# Patient Record
Sex: FEMALE | Race: WHITE | Employment: FULL TIME | URBAN - METROPOLITAN AREA
[De-identification: names, ages, dates, MRNs, and addresses within clinical notes are randomized per-mention and may not be internally consistent; named-entity substitution may affect disease eponyms.]

---

## 2018-05-02 ENCOUNTER — OFFICE VISIT (OUTPATIENT)
Dept: FAMILY MEDICINE CLINIC | Age: 23
End: 2018-05-02
Payer: MEDICARE

## 2018-05-02 VITALS
TEMPERATURE: 98.3 F | BODY MASS INDEX: 26.45 KG/M2 | OXYGEN SATURATION: 96 % | WEIGHT: 164.6 LBS | SYSTOLIC BLOOD PRESSURE: 97 MMHG | RESPIRATION RATE: 16 BRPM | HEIGHT: 66 IN | DIASTOLIC BLOOD PRESSURE: 63 MMHG | HEART RATE: 74 BPM

## 2018-05-02 DIAGNOSIS — R23.8 OTHER SKIN CHANGES: ICD-10-CM

## 2018-05-02 DIAGNOSIS — Z00.01 ENCOUNTER FOR WELL ADULT EXAM WITH ABNORMAL FINDINGS: Primary | ICD-10-CM

## 2018-05-02 DIAGNOSIS — E88.81 INSULIN RESISTANCE: ICD-10-CM

## 2018-05-02 DIAGNOSIS — R53.83 FATIGUE, UNSPECIFIED TYPE: ICD-10-CM

## 2018-05-02 PROCEDURE — 1036F TOBACCO NON-USER: CPT | Performed by: FAMILY MEDICINE

## 2018-05-02 PROCEDURE — 99395 PREV VISIT EST AGE 18-39: CPT | Performed by: FAMILY MEDICINE

## 2018-05-02 PROCEDURE — G8419 CALC BMI OUT NRM PARAM NOF/U: HCPCS | Performed by: FAMILY MEDICINE

## 2018-05-02 PROCEDURE — G8427 DOCREV CUR MEDS BY ELIG CLIN: HCPCS | Performed by: FAMILY MEDICINE

## 2018-05-02 PROCEDURE — 99214 OFFICE O/P EST MOD 30 MIN: CPT | Performed by: FAMILY MEDICINE

## 2018-05-02 ASSESSMENT — PATIENT HEALTH QUESTIONNAIRE - PHQ9
SUM OF ALL RESPONSES TO PHQ9 QUESTIONS 1 & 2: 0
2. FEELING DOWN, DEPRESSED OR HOPELESS: 0
SUM OF ALL RESPONSES TO PHQ QUESTIONS 1-9: 0
1. LITTLE INTEREST OR PLEASURE IN DOING THINGS: 0

## 2018-05-03 LAB
ALBUMIN SERPL-MCNC: NORMAL G/DL
ALP BLD-CCNC: NORMAL U/L
ALT SERPL-CCNC: NORMAL U/L
ANION GAP SERPL CALCULATED.3IONS-SCNC: NORMAL MMOL/L
AST SERPL-CCNC: NORMAL U/L
BASOPHILS ABSOLUTE: NORMAL /ΜL
BASOPHILS RELATIVE PERCENT: NORMAL %
BILIRUB SERPL-MCNC: NORMAL MG/DL (ref 0.1–1.4)
BILIRUBIN, URINE: NORMAL
BLOOD, URINE: NORMAL
BUN BLDV-MCNC: NORMAL MG/DL
CALCIUM SERPL-MCNC: NORMAL MG/DL
CHLORIDE BLD-SCNC: NORMAL MMOL/L
CLARITY: NORMAL
CO2: NORMAL MMOL/L
COLOR: NORMAL
CREAT SERPL-MCNC: NORMAL MG/DL
EOSINOPHILS ABSOLUTE: NORMAL /ΜL
EOSINOPHILS RELATIVE PERCENT: NORMAL %
GFR CALCULATED: NORMAL
GLUCOSE BLD-MCNC: NORMAL MG/DL
GLUCOSE URINE: NORMAL
HCT VFR BLD CALC: NORMAL % (ref 36–46)
HEMOGLOBIN: NORMAL G/DL (ref 12–16)
KETONES, URINE: NORMAL
LEUKOCYTE ESTERASE, URINE: NORMAL
LYMPHOCYTES ABSOLUTE: NORMAL /ΜL
LYMPHOCYTES RELATIVE PERCENT: NORMAL %
MCH RBC QN AUTO: NORMAL PG
MCHC RBC AUTO-ENTMCNC: NORMAL G/DL
MCV RBC AUTO: NORMAL FL
MONOCYTES ABSOLUTE: NORMAL /ΜL
MONOCYTES RELATIVE PERCENT: NORMAL %
NEUTROPHILS ABSOLUTE: NORMAL /ΜL
NEUTROPHILS RELATIVE PERCENT: NORMAL %
NITRITE, URINE: NORMAL
PDW BLD-RTO: NORMAL %
PH UA: NORMAL (ref 4.5–8)
PLATELET # BLD: NORMAL K/ΜL
PMV BLD AUTO: NORMAL FL
POTASSIUM SERPL-SCNC: NORMAL MMOL/L
PROTEIN UA: NORMAL
RBC # BLD: NORMAL 10^6/ΜL
SODIUM BLD-SCNC: NORMAL MMOL/L
SPECIFIC GRAVITY, URINE: NORMAL
T4 FREE: NORMAL
TOTAL PROTEIN: NORMAL
TSH SERPL DL<=0.05 MIU/L-ACNC: NORMAL UIU/ML
UROBILINOGEN, URINE: NORMAL
VITAMIN B-12: NORMAL
VITAMIN D 25-HYDROXY: NORMAL
VITAMIN D2, 25 HYDROXY: NORMAL
VITAMIN D3,25 HYDROXY: NORMAL
WBC # BLD: NORMAL 10^3/ML

## 2018-05-07 DIAGNOSIS — R53.83 FATIGUE, UNSPECIFIED TYPE: ICD-10-CM

## 2018-05-07 DIAGNOSIS — E88.81 INSULIN RESISTANCE: ICD-10-CM

## 2018-05-07 DIAGNOSIS — Z00.01 ENCOUNTER FOR WELL ADULT EXAM WITH ABNORMAL FINDINGS: ICD-10-CM

## 2018-05-08 RX ORDER — ERGOCALCIFEROL (VITAMIN D2) 1250 MCG
50000 CAPSULE ORAL WEEKLY
Qty: 4 CAPSULE | Refills: 3 | Status: SHIPPED | OUTPATIENT
Start: 2018-05-08 | End: 2021-07-02

## 2018-08-01 ENCOUNTER — HOSPITAL ENCOUNTER (OUTPATIENT)
Dept: GENERAL RADIOLOGY | Age: 23
Discharge: HOME | End: 2018-08-03
Payer: MEDICARE

## 2018-08-01 ENCOUNTER — HOSPITAL ENCOUNTER (OUTPATIENT)
Age: 23
Discharge: HOME | End: 2018-08-03
Payer: MEDICARE

## 2018-08-01 DIAGNOSIS — M25.512 LEFT SHOULDER PAIN, UNSPECIFIED CHRONICITY: ICD-10-CM

## 2018-08-01 PROCEDURE — 72040 X-RAY EXAM NECK SPINE 2-3 VW: CPT

## 2021-01-30 ENCOUNTER — APPOINTMENT (OUTPATIENT)
Dept: GENERAL RADIOLOGY | Age: 26
End: 2021-01-30
Payer: OTHER MISCELLANEOUS

## 2021-01-30 ENCOUNTER — HOSPITAL ENCOUNTER (EMERGENCY)
Age: 26
Discharge: HOME | End: 2021-01-30
Attending: EMERGENCY MEDICINE
Payer: OTHER MISCELLANEOUS

## 2021-01-30 VITALS
DIASTOLIC BLOOD PRESSURE: 78 MMHG | RESPIRATION RATE: 16 BRPM | OXYGEN SATURATION: 99 % | SYSTOLIC BLOOD PRESSURE: 127 MMHG | HEART RATE: 78 BPM | TEMPERATURE: 98.2 F
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
## 2021-01-30 DIAGNOSIS — S39.012A STRAIN OF LUMBAR REGION, INITIAL ENCOUNTER: ICD-10-CM

## 2021-01-30 DIAGNOSIS — S46.912A STRAIN OF LEFT SHOULDER, INITIAL ENCOUNTER: Primary | ICD-10-CM

## 2021-01-30 PROCEDURE — 6370000000 HC RX 637 (ALT 250 FOR IP): Performed by: STUDENT IN AN ORGANIZED HEALTH CARE EDUCATION/TRAINING PROGRAM

## 2021-01-30 PROCEDURE — 73030 X-RAY EXAM OF SHOULDER: CPT

## 2021-01-30 PROCEDURE — 99284 EMERGENCY DEPT VISIT MOD MDM: CPT

## 2021-01-30 PROCEDURE — 72100 X-RAY EXAM L-S SPINE 2/3 VWS: CPT

## 2021-01-30 RX ORDER — METHOCARBAMOL 500 MG/1
1000 TABLET, FILM COATED ORAL ONCE
Status: COMPLETED | OUTPATIENT
Start: 2021-01-30 | End: 2021-01-30

## 2021-01-30 RX ORDER — METHOCARBAMOL 750 MG/1
750 TABLET, FILM COATED ORAL 4 TIMES DAILY PRN
Qty: 40 TABLET | Refills: 0 | Status: SHIPPED | OUTPATIENT
Start: 2021-01-30 | End: 2021-02-09

## 2021-01-30 RX ORDER — ACETAMINOPHEN 500 MG
1000 TABLET ORAL ONCE
Status: COMPLETED | OUTPATIENT
Start: 2021-01-30 | End: 2021-01-30

## 2021-01-30 RX ORDER — ACETAMINOPHEN 500 MG
1000 TABLET ORAL 3 TIMES DAILY
Qty: 42 TABLET | Refills: 0 | Status: SHIPPED | OUTPATIENT
Start: 2021-01-30 | End: 2022-04-29

## 2021-01-30 RX ADMIN — ACETAMINOPHEN 1000 MG: 500 TABLET ORAL at 22:33

## 2021-01-30 RX ADMIN — METHOCARBAMOL 1000 MG: 500 TABLET ORAL at 22:33

## 2021-01-30 ASSESSMENT — ENCOUNTER SYMPTOMS
SHORTNESS OF BREATH: 0
COUGH: 0
VOMITING: 0
ABDOMINAL PAIN: 0
RHINORRHEA: 0
NAUSEA: 0

## 2021-01-30 ASSESSMENT — PAIN SCALES - GENERAL
PAINLEVEL_OUTOF10: 7
PAINLEVEL_OUTOF10: 8
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
## 2021-01-31 NOTE — ED NOTES
Bed: 01  Expected date:   Expected time:   Means of arrival:   Comments:  Medic Michele Larsen RN  01/30/21 1985
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
## 2021-07-02 ENCOUNTER — OFFICE VISIT (OUTPATIENT)
Dept: FAMILY MEDICINE CLINIC | Age: 26
End: 2021-07-02
Payer: MEDICARE

## 2021-07-02 VITALS
HEART RATE: 76 BPM | HEIGHT: 67 IN | TEMPERATURE: 97.5 F | OXYGEN SATURATION: 98 % | BODY MASS INDEX: 32.11 KG/M2 | DIASTOLIC BLOOD PRESSURE: 72 MMHG | WEIGHT: 204.6 LBS | SYSTOLIC BLOOD PRESSURE: 118 MMHG

## 2021-07-02 DIAGNOSIS — R19.4 CHANGE IN BOWEL HABITS: ICD-10-CM

## 2021-07-02 DIAGNOSIS — F41.9 ANXIETY AND DEPRESSION: ICD-10-CM

## 2021-07-02 DIAGNOSIS — Z76.89 ENCOUNTER TO ESTABLISH CARE: Primary | ICD-10-CM

## 2021-07-02 DIAGNOSIS — R19.5 LOOSE STOOLS: ICD-10-CM

## 2021-07-02 DIAGNOSIS — F32.A ANXIETY AND DEPRESSION: ICD-10-CM

## 2021-07-02 PROCEDURE — 99203 OFFICE O/P NEW LOW 30 MIN: CPT | Performed by: NURSE PRACTITIONER

## 2021-07-02 PROCEDURE — 1036F TOBACCO NON-USER: CPT | Performed by: NURSE PRACTITIONER

## 2021-07-02 PROCEDURE — G8427 DOCREV CUR MEDS BY ELIG CLIN: HCPCS | Performed by: NURSE PRACTITIONER

## 2021-07-02 PROCEDURE — G8417 CALC BMI ABV UP PARAM F/U: HCPCS | Performed by: NURSE PRACTITIONER

## 2021-07-02 RX ORDER — MELATONIN
1000 DAILY
Qty: 30 TABLET | Refills: 11 | Status: SHIPPED | OUTPATIENT
Start: 2021-07-02 | End: 2022-08-22

## 2021-07-02 RX ORDER — LIDOCAINE 50 MG/G
1 PATCH TOPICAL DAILY PRN
COMMUNITY

## 2021-07-02 RX ORDER — ESCITALOPRAM OXALATE 10 MG/1
15 TABLET ORAL DAILY
COMMUNITY
Start: 2021-06-04 | End: 2022-03-02

## 2021-07-02 SDOH — ECONOMIC STABILITY: FOOD INSECURITY: WITHIN THE PAST 12 MONTHS, THE FOOD YOU BOUGHT JUST DIDN'T LAST AND YOU DIDN'T HAVE MONEY TO GET MORE.: NEVER TRUE

## 2021-07-02 SDOH — ECONOMIC STABILITY: FOOD INSECURITY: WITHIN THE PAST 12 MONTHS, YOU WORRIED THAT YOUR FOOD WOULD RUN OUT BEFORE YOU GOT MONEY TO BUY MORE.: NEVER TRUE

## 2021-07-02 SDOH — ECONOMIC STABILITY: TRANSPORTATION INSECURITY
IN THE PAST 12 MONTHS, HAS LACK OF TRANSPORTATION KEPT YOU FROM MEETINGS, WORK, OR FROM GETTING THINGS NEEDED FOR DAILY LIVING?: NO

## 2021-07-02 SDOH — ECONOMIC STABILITY: TRANSPORTATION INSECURITY
IN THE PAST 12 MONTHS, HAS THE LACK OF TRANSPORTATION KEPT YOU FROM MEDICAL APPOINTMENTS OR FROM GETTING MEDICATIONS?: NO

## 2021-07-02 ASSESSMENT — ENCOUNTER SYMPTOMS
DIARRHEA: 1
COLOR CHANGE: 0
NAUSEA: 1
CONSTIPATION: 0
FLATUS: 0
COUGH: 0
SHORTNESS OF BREATH: 0
ALLERGIC/IMMUNOLOGIC NEGATIVE: 1
BLOATING: 0
EYES NEGATIVE: 1
BLOOD IN STOOL: 0
ABDOMINAL PAIN: 0
VOMITING: 0
ANAL BLEEDING: 0
RESPIRATORY NEGATIVE: 1

## 2021-07-02 ASSESSMENT — PATIENT HEALTH QUESTIONNAIRE - PHQ9
2. FEELING DOWN, DEPRESSED OR HOPELESS: 0
SUM OF ALL RESPONSES TO PHQ QUESTIONS 1-9: 0
SUM OF ALL RESPONSES TO PHQ9 QUESTIONS 1 & 2: 0
1. LITTLE INTEREST OR PLEASURE IN DOING THINGS: 0

## 2021-07-02 ASSESSMENT — SOCIAL DETERMINANTS OF HEALTH (SDOH): HOW HARD IS IT FOR YOU TO PAY FOR THE VERY BASICS LIKE FOOD, HOUSING, MEDICAL CARE, AND HEATING?: NOT HARD AT ALL

## 2021-07-02 NOTE — PROGRESS NOTES
[facility]  [address] [facility]  Dept: [phone]    Mikey Short is a 23 y.o. female who presents today for her medical conditions/complaintsas noted below. Mikey Short is here today c/o Establish Care, Stool Color Change (yellow, loose), and Depression (wants gene testing)    Past Medical History:   Diagnosis Date    Anxiety     Depression     Insulin resistance     PCOS (polycystic ovarian syndrome)     Ulnar neuropathy       Past Surgical History:   Procedure Laterality Date    ULNAR TUNNEL RELEASE Left     WISDOM TOOTH EXTRACTION         Family History   Problem Relation Age of Onset    COPD Mother     No Known Problems Father     Diabetes type 2  Maternal Grandmother     COPD Maternal Grandmother     Diabetes type 2  Maternal Grandfather     Hypertension Maternal Grandfather     Lung Cancer Paternal Grandmother        Social History     Tobacco Use    Smoking status: Never Smoker    Smokeless tobacco: Never Used   Substance Use Topics    Alcohol use: Yes     Comment: social      Current Outpatient Medications   Medication Sig Dispense Refill    escitalopram (LEXAPRO) 10 MG tablet daily      lidocaine (LIDODERM) 5 % Place 1 patch onto the skin daily as needed for Pain 12 hours on, 12 hours off.  vitamin D3 (CHOLECALCIFEROL) 25 MCG (1000 UT) TABS tablet Take 1 tablet by mouth daily 30 tablet 11    acetaminophen (TYLENOL) 500 MG tablet Take 2 tablets by mouth 3 times daily for 7 days 42 tablet 0    Levocetirizine Dihydrochloride (XYZAL PO) Take by mouth       No current facility-administered medications for this visit.      Allergies   Allergen Reactions    Amoxicillin     Iv [Iodides]     Keflex [Cephalexin]     Naproxen     Pcn [Penicillins]          HPI:     Presents today c/o new patient  No previous PCP   Childhood vaccines UTD     Specialists - Neurology Dr. Champ Olmstead - h/o ulnar neuropathy  GYN - Dr. Alberto Umana   Psychiatry - [employer] h/o anxiety / depression    PMH - PCOS, ulnar neuropathy, anxiety / depression   PSH - ulnar tunnel release   PFH - COPD in mother & grandmother  Non smoker, social alcohol, no illicit drug use     Diarrhea   This is a chronic (loose stools, yellow in color, ongoing x 2019, described as yellow in color) problem. The current episode started more than 1 year ago. The problem occurs 2 to 4 times per day (2-3 bowel movements daily). The problem has been waxing and waning. Diarrhea characteristics: yellow in color \"floats\", no blood, mucus or watery diarrhea. The patient states that diarrhea does not awaken her from sleep. Pertinent negatives include no abdominal pain, arthralgias, bloating, chills, coughing, fever, headaches, increased  flatus, myalgias, sweats, URI, vomiting or weight loss. Associated symptoms comments: + GERD. Exacerbated by: all food. There are no known risk factors. Treatments tried: Pepto Bismol, OTC antiacid  The treatment provided moderate relief. There is no history of bowel resection, inflammatory bowel disease, irritable bowel syndrome, malabsorption, a recent abdominal surgery or short gut syndrome. Used to have issues with constipation   2 years ago sx changed entirely   Declines any significant known GI history   Doesn't related sx to any of her medication(s)     Health Maintenance:      Subjective:     Review of Systems   Constitutional: Negative. Negative for appetite change, chills, diaphoresis, fatigue, fever, unexpected weight change and weight loss. HENT: Negative. Eyes: Negative. Respiratory: Negative. Negative for cough and shortness of breath. Cardiovascular: Negative. Gastrointestinal: Positive for diarrhea and nausea (intermittent). Negative for abdominal pain, anal bleeding, bloating, blood in stool, constipation, flatus and vomiting. Endocrine: Negative. Genitourinary: Negative. Negative for difficulty urinating and dysuria. Musculoskeletal: Negative. Negative for arthralgias and myalgias. Skin: Negative. Negative for color change, pallor, rash and wound. Allergic/Immunologic: Negative. Neurological: Negative. Negative for seizures, syncope, speech difficulty and headaches. Hematological: Negative. Psychiatric/Behavioral: Negative. Objective:     Vitals:    07/02/21 1005   BP: 118/72   Pulse: 76   Temp: 97.5 °F (36.4 °C)   SpO2: 98%       Body mass index is 32.53 kg/m². Physical Exam  Constitutional:       General: She is not in acute distress. Appearance: Normal appearance. She is well-developed. She is obese. She is not ill-appearing, toxic-appearing or diaphoretic. HENT:      Head: Normocephalic and atraumatic. Right Ear: Tympanic membrane, ear canal and external ear normal.      Left Ear: Tympanic membrane, ear canal and external ear normal.      Nose: Nose normal.      Mouth/Throat:      Mouth: Mucous membranes are moist.      Pharynx: Oropharynx is clear. Eyes:      General: No scleral icterus. Right eye: No discharge. Left eye: No discharge. Conjunctiva/sclera: Conjunctivae normal.      Pupils: Pupils are equal, round, and reactive to light. Neck:      Trachea: No tracheal deviation. Cardiovascular:      Rate and Rhythm: Normal rate and regular rhythm. Heart sounds: Normal heart sounds. No murmur heard. No friction rub. No gallop. Pulmonary:      Effort: Pulmonary effort is normal. No tachypnea, accessory muscle usage or respiratory distress. Breath sounds: Normal breath sounds. No stridor. No decreased breath sounds, wheezing, rhonchi or rales. Abdominal:      General: Abdomen is flat. Bowel sounds are normal. There is no distension. Palpations: Abdomen is soft. Tenderness: There is abdominal tenderness in the left lower quadrant. There is no guarding or rebound. Musculoskeletal:         General: No tenderness or deformity.  Normal range of motion. Cervical back: Normal range of motion and neck supple. Skin:     General: Skin is warm and dry. Coloration: Skin is not pale. Findings: No erythema or rash. Neurological:      Mental Status: She is alert and oriented to person, place, and time. GCS: GCS eye subscore is 4. GCS verbal subscore is 5. GCS motor subscore is 6. Gait: Gait is intact. Gait normal.   Psychiatric:         Speech: Speech normal.         Behavior: Behavior normal.         Thought Content: Thought content normal.         Judgment: Judgment normal.           Assessment:         1. Encounter to establish care    2. Change in bowel habits    3. Loose stools    4. Anxiety and depression        Plan:     1. Encounter to establish care    PMH, [address], PF reviewed  COVID 19 vaccine encouraged  GYN for women's health needs    2. Change in bowel habits    - [facility], [address]    Recommend GI consult for possible colonoscopy  Recommended daily probiotic  Elimination diet discussed  No red flag s/s identified   Follow-up PRN   Bentyl if needed      3. Loose stools    - [facility], [address]    4. Anxiety and depression    Follows w/ [facility]  Recommended discussing concentration difficulties & Gene Testing with specialist     Discussed use, benefit, and side effects of prescribed medications. All patient questions answered. Pt voiced understanding. Reviewed health maintenance. Instructed to continue current medications, diet and exercise. Patient agreedwith treatment plan. Follow up as directed.      Electronically signed by FRANTZ Edwards CNP on 7/2/2021

## 2021-07-02 NOTE — PATIENT INSTRUCTIONS
[facility] Gastroenterology  [address]  [phone]  Patient Education        Lactose-Restricted Diet: Care Instructions  Your Care Instructions     Lactose is a sugar that is in milk and milk products. Some people do not make enough of an enzyme called lactase, which digests lactose. When this happens it can cause gas, belly pain, diarrhea, and bloating. This is called lactose intolerance. This is not the same as food allergy to milk. With planning, you can avoid lactose and still eat a tasty and nutritious diet and get enough calcium to maintain healthy bones. Your doctor and dietitian will help you design a diet based on your level of lactose intolerance and what you like to eat. Always talk with your doctor or dietitian before you make changes in your diet. Follow-up care is a key part of your treatment and safety. Be sure to make and go to all appointments, and call your doctor if you are having problems. It's also a good idea to know your test results and keep a list of the medicines you take. How can you care for yourself at home? · Limit the amount of milk and milk products in your diet. Spread small amounts of milk or milk products throughout the day, instead of larger amounts all at once. ? If you have bad symptoms when you eat or drink something with lactose, you may need to avoid it completely. ? You may be able to drink 1 glass of milk each day, although you may not be able to drink more than a ½ cup at a time. All types of milk contain the same amount of lactose. ? If you are not sure whether a milk product causes symptoms, try a small amount and wait to see how you feel before you eat or drink more. · Try yogurt and cheese. These have less lactose than milk and may not cause problems. · Eat or drink milk and milk products that have reduced lactose.  In most grocery stores, you can buy milk with reduced lactose, such as Lactaid milk.  · Use lactase products. These are dietary supplements that help you digest lactose. Some are pills that you chew (such as Lactaid) before you eat or drink milk products. Others are liquids that you add to milk 24 hours before you drink it. Try a few products and brands to see which ones work best for you. · Eat or drink other foods, such as soy milk and soy cheese, instead of milk and milk products. · If you are very sensitive to lactose, read labels carefully to spot the lactose products. ? Some medicines have lactose. ? Prepared foods that may have lactose include breads, baked goods, breakfast cereals, instant breakfast drinks, instant potatoes, instant soups, baking mixes (such as pancake, cookie, and biscuit mixes), margarine, salad dressings, candies, milk chocolate, and other snacks. ? Lactose may also be called whey, curds, or milk products. · Be sure to get enough calcium in your diet, especially if you avoid milk products completely. To get enough calcium, you would need to eat calcium-rich foods as often as someone would drink milk. Calcium is very important because it keeps bones strong and reduces the risk of osteoporosis. Ask your dietitian for advice on how to get enough calcium. Foods that have calcium include:  ? Broccoli, bok veronica, kale, and gracia, mustard, and turnip greens. ? Canned sardines and other small fish that have bones you can eat. ? Calcium-fortified orange juice. ? Soy products such as fortified soy milk and tofu. ? Almonds. ? Dried beans. · If you are worried about getting enough nutrients, ask your doctor about taking supplements, such as calcium and vitamin D. When should you call for help? Call your doctor now or seek immediate medical care if:    · You have new or worse belly pain. Watch closely for changes in your health, and be sure to contact your doctor if:    · You do not get better as expected. Where can you learn more?   Go to [email or web address] and sign in to your [employer] account. Enter S971 in the [facility] box to learn more about \"Lactose-Restricted Diet: Care Instructions. \"     If you do not have an account, please click on the \"Sign Up Now\" link. Current as of: February 10, 2021               Content Version: 12.9  © 2006-2021 [employer]. Care instructions adapted under license by [facility] ([facility]). If you have questions about a medical condition or this instruction, always ask your healthcare professional. [place] [address] any warranty or liability for your use of this information. Patient Education        Lactose Intolerance: Care Instructions  Your Care Instructions     Lactose is sugar that is found in milk and milk products. Some people do not make enough of an enzyme called lactase, which digests lactose. When this happens it can cause gas, belly pain, diarrhea, and bloating. This is called lactose intolerance. This is not the same as food allergy to milk. Lactose intolerance affects different people in different ways. Some people cannot digest any milk products. Other people can eat or drink small amounts of milk products or certain types of milk products without problems. You can learn how to avoid discomfort and still get enough calcium to maintain healthy bones. Follow-up care is a key part of your treatment and safety. Be sure to make and go to all appointments, and call your doctor if you are having problems. It's also a good idea to know your test results and keep a list of the medicines you take. How can you care for yourself at home? · Limit the amount of milk and milk products in your diet. Try to drink 1 glass of milk each day. Drink small amounts several times a day. All types of milk contain the same amount of lactose.  If you are not sure whether a milk product causes symptoms, try a small amount and wait to see how you feel before you eat or drink more. · Eat or drink milk and milk products along with other foods. For some people, combining a solid food (like cereal) with a dairy product (like milk) can reduce symptoms. · Eat small amounts of milk products throughout the day instead of larger amounts all at once. · Eat or drink milk and milk products that have reduced lactose. In most grocery stores, you can buy milk with reduced lactose, such as Lactaid milk. · Eat or drink other foods instead of milk and milk products. Try soy milk and soy cheese, and use nondairy creamers in your coffee. Keep in mind that nondairy creamers may contain more fat than milk. · Use lactase products. These are dietary supplements that help you digest lactose. Some are pills that you chew (such as Lactaid) before you eat or drink milk products. Others are liquids that you add to milk 24 hours before you drink it. Try a few products and brands to see which ones work best for you. · Some people who are lactose-intolerant can eat some kinds of yogurt without problems, especially yogurt with live cultures. It's best to try a small amount of different brands of yogurt to see which ones work best for you. · Watch out for lactose in foods you buy. Some prepared foods contain lactose, including breads and baked goods, breakfast cereals, instant potatoes and soups, margarine, salad dressings, and many snacks. Be sure to read labels for lactose and for lactose's \"hidden\" names. These include dry milk solids, whey, curds, milk by-products, and nonfat dry milk powder. · Be sure to get enough calcium in your diet, especially if you avoid milk products completely. To get enough calcium, you would need to eat calcium-rich foods as often as someone would drink milk. Calcium is very important because it keeps bones strong and reduces the risk of osteoporosis. Ask your dietitian for advice on how to get enough calcium.  Foods that have calcium include:  ? Hermilo Ramos, you take. How can you care for yourself at home? · Don't eat any foods that have gluten in them. These include bagels, bread, crackers, and some cereals. They also include pasta and pizza. · Carefully read food labels. Look for wheat or wheat products in ice cream and candy. You may also find them in salad dressing, canned and frozen soups and vegetables, and other processed foods. · Avoid all beer products unless the label says they are gluten-free. Beers with and without alcohol have gluten unless the labels say they are gluten-free. This includes lagers, ales, and stouts. · Avoid oats, at least at first. Oats may cause symptoms in some people, perhaps as a result of contamination with wheat, barley, or rye during processing. But many people who have celiac disease can eat moderate amounts of oats without having symptoms. Health professionals vary in their long-term recommendations regarding eating foods with oats. But most agree it is safe to eat oats labeled as gluten-free. · When you eat out, look for restaurants that serve gluten-free food. You can also ask if the  is familiar with gluten-free cooking. · Try to learn more about gluten-free options. Find grocery stores that sell gluten-free pizza and other foods. If you have access to the Internet, look online for gluten-free foods and recipes. · On a gluten-free eating plan, it's okay to have:  ? Eggs and dairy products. (But some dairy products may make your symptoms worse. Ask your doctor if you have questions about dairy products. Read ingredient labels carefully. Some processed cheeses contain gluten.)  ? Flours and foods made with amaranth, arrowroot, beans, buckwheat, corn, cornmeal, flax, millet, potatoes, gluten-free nut and oat bran, quinoa, rice, sorghum, soybeans, tapioca, or teff. ? Fresh, frozen, or canned unprocessed meats. But avoid processed meats. Some examples of processed meats to avoid are hot dogs, salami, and deli meat.  Read labels for additives that may contain gluten. ? Fresh, frozen, dried, or canned fruits and vegetables, if they do not have thickeners or other additives that contain gluten. ? Some alcohol drinks. These include wine, liqueurs, and ciders. They also include liquor like whiskey and carl. When should you call for help? Watch closely for changes in your health, and be sure to contact your doctor if:    · You have unexplained weight loss.     · You have diarrhea that lasts longer than 1 to 2 weeks.     · You have unusual fatigue or mood changes, especially if these last more than a week and are not related to any other illness, such as the flu.     · Your symptoms come back again.     · Your stomach pain gets worse. Where can you learn more? Go to https://Affinity.[employer]. org and sign in to your [employer] account. Enter [phone] in the [employer] box to learn more about \"Gluten-Free Diet: Care Instructions. \"     If you do not have an account, please click on the \"Sign Up Now\" link. Current as of: December 17, 2020               Content Version: 12.9  © 9911-0391 Healthwise, [employer] [email or web address]. Care instructions adapted under license by [facility] [address] If you have questions about a medical condition or this instruction, always ask your healthcare professional. [place] [address] any warranty or liability for your use of this information.

## 2021-09-28 ENCOUNTER — TELEPHONE (OUTPATIENT)
Dept: GASTROENTEROLOGY | Age: 26
End: 2021-09-28

## 2021-09-30 NOTE — TELEPHONE ENCOUNTER
Pt returned call and lvm that we would need to call her before 2pm to speak with her.  Writer called around 1200 and lvm for her to callback and set appt up from referral.

## 2021-10-01 ENCOUNTER — OFFICE VISIT (OUTPATIENT)
Dept: GASTROENTEROLOGY | Age: 26
End: 2021-10-01
Payer: MEDICARE

## 2021-10-01 VITALS
WEIGHT: 204 LBS | OXYGEN SATURATION: 97 % | DIASTOLIC BLOOD PRESSURE: 72 MMHG | SYSTOLIC BLOOD PRESSURE: 113 MMHG | BODY MASS INDEX: 32.78 KG/M2 | HEIGHT: 66 IN | HEART RATE: 94 BPM

## 2021-10-01 DIAGNOSIS — R19.5 LOOSE STOOLS: ICD-10-CM

## 2021-10-01 DIAGNOSIS — K58.2 IRRITABLE BOWEL SYNDROME WITH BOTH CONSTIPATION AND DIARRHEA: Primary | ICD-10-CM

## 2021-10-01 PROCEDURE — 99204 OFFICE O/P NEW MOD 45 MIN: CPT | Performed by: INTERNAL MEDICINE

## 2021-10-01 PROCEDURE — G8417 CALC BMI ABV UP PARAM F/U: HCPCS | Performed by: INTERNAL MEDICINE

## 2021-10-01 PROCEDURE — APPSS60 APP SPLIT SHARED TIME 46-60 MINUTES: Performed by: NURSE PRACTITIONER

## 2021-10-01 PROCEDURE — G8484 FLU IMMUNIZE NO ADMIN: HCPCS | Performed by: INTERNAL MEDICINE

## 2021-10-01 PROCEDURE — G8427 DOCREV CUR MEDS BY ELIG CLIN: HCPCS | Performed by: INTERNAL MEDICINE

## 2021-10-01 PROCEDURE — 1036F TOBACCO NON-USER: CPT | Performed by: INTERNAL MEDICINE

## 2021-10-01 RX ORDER — FLUTICASONE PROPIONATE 50 MCG
SPRAY, SUSPENSION (ML) NASAL
COMMUNITY
Start: 2021-09-17 | End: 2022-06-09 | Stop reason: SDUPTHER

## 2021-10-01 ASSESSMENT — ENCOUNTER SYMPTOMS
TROUBLE SWALLOWING: 0
NAUSEA: 0
ABDOMINAL DISTENTION: 0
CHOKING: 0
COUGH: 0
RECTAL PAIN: 0
SORE THROAT: 0
VOMITING: 0
BACK PAIN: 0
CONSTIPATION: 0
BLOOD IN STOOL: 0
VOICE CHANGE: 0
ANAL BLEEDING: 0
ABDOMINAL PAIN: 0
DIARRHEA: 0
SHORTNESS OF BREATH: 0

## 2021-10-01 NOTE — PROGRESS NOTES
Reason for Referral:   Lyla Rashid, APRN - J[facility]  [address]  [facility],  [facility]      HISTORY OF PRESENT ILLNESS:     New patient being seen for c/o loose stools, yellow stools, abdominal pain, heartburns. Patient reports that for the last few years she has been having loose stools, yellow stools. Typically 2-3 a day. In between she has constipation. Denies any melena, hematochezia. No nocturnal bowel movements. No weight loss. Has occasional mid abdominal pain/pelvic pain. Reports pelvic pain with intercourse. No dysphagia, odynophagia. Occasionally has dyspepsia. Takes Dahlia Rater as needed. Has significant stress. Takes Lexapro daily. Reports this helps her greatly. No known family hx of IBD, colon cancer. Past Medical,Family, and Social History reviewed and does contribute to the patient presentingcondition. Patient's PMH/PSH,SH,PSYCH Hx, MEDs, ALLERGIES, and ROS were all reviewed and updated in the appropriate sections.     PAST MEDICAL HISTORY:  Past Medical History:   Diagnosis Date    Anxiety     Depression     Insulin resistance     PCOS (polycystic ovarian syndrome)     Ulnar neuropathy        Past Surgical History:   Procedure Laterality Date    ULNAR TUNNEL RELEASE Left     WISDOM TOOTH EXTRACTION         CURRENT MEDICATIONS:    Current Outpatient Medications:     fluticasone (FLONASE) 50 MCG/ACT nasal spray, , Disp: , Rfl:     escitalopram (LEXAPRO) 10 MG tablet, daily, Disp: , Rfl:     lidocaine (LIDODERM) 5 %, Place 1 patch onto the skin daily as needed for Pain 12 hours on, 12 hours off., Disp: , Rfl:     vitamin D3 (CHOLECALCIFEROL) 25 MCG (1000 UT) TABS tablet, Take 1 tablet by mouth daily, Disp: 30 tablet, Rfl: 11    acetaminophen (TYLENOL) 500 MG tablet, Take 2 tablets by mouth 3 times daily for 7 days, Disp: 42 tablet, Rfl: 0    Levocetirizine Dihydrochloride (XYZAL PO), Take by mouth, Disp: , Rfl:     ALLERGIES: Allergies   Allergen Reactions    Amoxicillin     Iv [Iodides] Hives    Keflex [Cephalexin]      Reaction unknown     Naproxen      Chest pain     Pcn [Penicillins] Hives       FAMILY HISTORY:       Problem Relation Age of Onset    COPD Mother     No Known Problems Father     Diabetes type 2  Maternal Grandmother     COPD Maternal Grandmother     Diabetes type 2  Maternal Grandfather     Hypertension Maternal Grandfather     Lung Cancer Paternal Grandmother          SOCIAL HISTORY:   Social History     Socioeconomic History    Marital status: Single     Spouse name: Not on file    Number of children: Not on file    Years of education: Not on file    Highest education level: Not on file   Occupational History    Not on file   Tobacco Use    Smoking status: Never Smoker    Smokeless tobacco: Never Used   Vaping Use    Vaping Use: Never used   Substance and Sexual Activity    Alcohol use: Yes     Comment: social    Drug use: No    Sexual activity: Not on file   Other Topics Concern    Not on file   Social History Narrative    Not on file     Social Determinants of Health     Financial Resource Strain: Low Risk     Difficulty of Paying Living Expenses: Not hard at all   Food Insecurity: No Food Insecurity    Worried About Running Out of Food in the Last Year: Never true    Kevyn of Food in the Last Year: Never true   Transportation Needs: No Transportation Needs    Lack of Transportation (Medical): No    Lack of Transportation (Non-Medical):  No   Physical Activity:     Days of Exercise per Week:     Minutes of Exercise per Session:    Stress:     Feeling of Stress :    Social Connections:     Frequency of Communication with Friends and Family:     Frequency of Social Gatherings with Friends and Family:     Attends [employer]ist Services:     Active Member of Clubs or Organizations:     Attends Club or Organization Meetings:     Marital Status:    Intimate Partner Violence:     Fear of Current or Ex-Partner:     Emotionally Abused:     Physically Abused:     Sexually Abused:        REVIEW OF SYSTEMS:       Review of Systems   Constitutional: Negative for appetite change, fatigue and unexpected weight change. HENT: Negative for sore throat, trouble swallowing and voice change. Eyes: Negative for visual disturbance. Respiratory: Negative for cough, choking and shortness of breath. Cardiovascular: Negative for chest pain and leg swelling. Gastrointestinal: Negative for abdominal distention, abdominal pain, anal bleeding, blood in stool, constipation, diarrhea, nausea, rectal pain and vomiting. Genitourinary: Negative for difficulty urinating. Musculoskeletal: Negative for back pain, joint swelling and myalgias. Neurological: Negative for dizziness, tremors, weakness, light-headedness, numbness and headaches. Hematological: Does not bruise/bleed easily. Psychiatric/Behavioral: Negative for sleep disturbance. The patient is not nervous/anxious. PHYSICAL EXAMINATION: Vital signs reviewed per the nursing documentation. /72   Pulse 94   Ht 5' 6\" (1.676 m)   Wt 204 lb (92.5 kg)   SpO2 97%   BMI 32.93 kg/m²   Body mass index is 32.93 kg/m². Physical Exam  Vitals and nursing note reviewed. Constitutional:       Appearance: She is well-developed. HENT:      Head: Normocephalic and atraumatic. Eyes:      General: No scleral icterus. Conjunctiva/sclera: Conjunctivae normal.      Pupils: Pupils are equal, round, and reactive to light. Neck:      Thyroid: No thyromegaly. Vascular: No hepatojugular reflux or JVD. Trachea: No tracheal deviation. Cardiovascular:      Rate and Rhythm: Normal rate and regular rhythm. Heart sounds: Normal heart sounds. Pulmonary:      Effort: Pulmonary effort is normal. No respiratory distress. Breath sounds: Normal breath sounds. No wheezing or rales.    Abdominal:      General: Bowel sounds are normal. There is no distension. Palpations: Abdomen is soft. There is no hepatomegaly or mass. Tenderness: There is no abdominal tenderness. There is no rebound. Hernia: No hernia is present. Musculoskeletal:         General: No tenderness. Cervical back: Normal range of motion and neck supple. Comments: No joint swelling   Lymphadenopathy:      Cervical: No cervical adenopathy. Skin:     General: Skin is warm. Findings: No bruising, ecchymosis, erythema or rash. Neurological:      Mental Status: She is alert and oriented to person, place, and time. Cranial Nerves: No cranial nerve deficit. Psychiatric:         Thought Content: Thought content normal.           LABORATORY DATA: Reviewed  No results found for: WBC, HGB, HCT, MCV, PLT, NA, K, CL, CO2, BUN, CREATININE, LABPROT, LABALBU, GGT, BILITOT, ALKPHOS, AST, ALT, INR      No results found for: RBC, HGB, MCV, MCH, MCHC, RDW, MPV, BASOPCT, LYMPHSABS, MONOSABS, NEUTROABS, EOSABS, BASOSABS      DIAGNOSTIC TESTING:     No results found. IMPRESSION: Ms. Saira Strickland is a 23 y.o. female with    Diagnosis Orders   1. Irritable bowel syndrome with both constipation and diarrhea           Assessment:  1. Irritable bowel syndrome with both constipation and diarrhea        Plan:    Symptoms not suggestive of IBD. Probable IBS  Will check stool for Giardia, ova, pancreatic elastase. We will also check tissue transglutaminase antibody, sed rate, TSH. Brochures given. Patient reports pelvic pain with intercourse. To follow-up with GYN    Follow-up in 4 to 6 weeks    Spent 30 minutes providing patient education and counseling. Thank you for allowing me to participate in the care of Ms. Saira Strickland. For any further questions please do not hesitate to contact me. GI attending physician note. Patient seen with APRN   I independently performed an evaluation on [facility].   I have reviewed the above documentation completed by the Nurse Practitioner and agree with the history, examination, and management plan. Recommendations discussed. History discussed with the patient and APRN and patient is examined by me. Patient has symptoms on and off for several years. Symptoms are typical of IBS. Discussed with the patient regarding this possibility and reassured. Brochures given. Patient is advised to have labs. At present she is not willing to up the dose of Flexeril. States that the current dose helping her stressful lifestyle. We will see her in the next 6 to 8 weeks. Note is dictated utilizing voice recognition software. Unfortunately this leads to occasional typographical errors. Please contact our office if you have any questions. I have reviewed and agree with the MA/RICAN ROS.      Maddie Turner MD, [facility]  Board Certified in Gastroenterology and [address] [place] [address] Gastroenterology  Office #: [phone]

## 2021-11-12 ENCOUNTER — OFFICE VISIT (OUTPATIENT)
Dept: GASTROENTEROLOGY | Age: 26
End: 2021-11-12
Payer: MEDICARE

## 2021-11-12 VITALS
BODY MASS INDEX: 40.97 KG/M2 | SYSTOLIC BLOOD PRESSURE: 125 MMHG | WEIGHT: 208.7 LBS | HEART RATE: 87 BPM | HEIGHT: 60 IN | OXYGEN SATURATION: 96 % | DIASTOLIC BLOOD PRESSURE: 74 MMHG

## 2021-11-12 DIAGNOSIS — F41.9 ANXIETY AND DEPRESSION: ICD-10-CM

## 2021-11-12 DIAGNOSIS — F32.A ANXIETY AND DEPRESSION: ICD-10-CM

## 2021-11-12 DIAGNOSIS — K58.2 IRRITABLE BOWEL SYNDROME WITH BOTH CONSTIPATION AND DIARRHEA: Primary | ICD-10-CM

## 2021-11-12 PROCEDURE — G8417 CALC BMI ABV UP PARAM F/U: HCPCS | Performed by: INTERNAL MEDICINE

## 2021-11-12 PROCEDURE — G8427 DOCREV CUR MEDS BY ELIG CLIN: HCPCS | Performed by: INTERNAL MEDICINE

## 2021-11-12 PROCEDURE — 99213 OFFICE O/P EST LOW 20 MIN: CPT | Performed by: INTERNAL MEDICINE

## 2021-11-12 PROCEDURE — 1036F TOBACCO NON-USER: CPT | Performed by: INTERNAL MEDICINE

## 2021-11-12 PROCEDURE — APPSS30 APP SPLIT SHARED TIME 16-30 MINUTES: Performed by: NURSE PRACTITIONER

## 2021-11-12 PROCEDURE — G8484 FLU IMMUNIZE NO ADMIN: HCPCS | Performed by: INTERNAL MEDICINE

## 2021-11-12 RX ORDER — GABAPENTIN 100 MG/1
CAPSULE ORAL
COMMUNITY
Start: 2021-10-18 | End: 2022-04-29

## 2021-11-12 ASSESSMENT — ENCOUNTER SYMPTOMS
ABDOMINAL DISTENTION: 1
DIARRHEA: 1
ANAL BLEEDING: 1
ABDOMINAL PAIN: 1
BACK PAIN: 1
RECTAL PAIN: 1
NAUSEA: 0
RESPIRATORY NEGATIVE: 1
CONSTIPATION: 1
BLOOD IN STOOL: 0
VOMITING: 0

## 2021-11-12 NOTE — PROGRESS NOTES
  lidocaine (LIDODERM) 5 %, Place 1 patch onto the skin daily as needed for Pain 12 hours on, 12 hours off., Disp: , Rfl:     vitamin D3 (CHOLECALCIFEROL) 25 MCG (1000 UT) TABS tablet, Take 1 tablet by mouth daily, Disp: 30 tablet, Rfl: 11    Levocetirizine Dihydrochloride (XYZAL PO), Take by mouth, Disp: , Rfl:     acetaminophen (TYLENOL) 500 MG tablet, Take 2 tablets by mouth 3 times daily for 7 days (Patient not taking: Reported on 11/12/2021), Disp: 42 tablet, Rfl: 0    ALLERGIES:   Allergies   Allergen Reactions    Amoxicillin     Iv [Iodides] Hives    Keflex [Cephalexin]      Reaction unknown     Naproxen      Chest pain     Pcn [Penicillins] Hives       FAMILY HISTORY:       Problem Relation Age of Onset    COPD Mother     No Known Problems Father     Diabetes type 2  Maternal Grandmother     COPD Maternal Grandmother     Diabetes type 2  Maternal Grandfather     Hypertension Maternal Grandfather     Lung Cancer Paternal Grandmother          SOCIAL HISTORY:   Social History     Socioeconomic History    Marital status: Single     Spouse name: Not on file    Number of children: Not on file    Years of education: Not on file    Highest education level: Not on file   Occupational History    Not on file   Tobacco Use    Smoking status: Never Smoker    Smokeless tobacco: Never Used   Vaping Use    Vaping Use: Never used   Substance and Sexual Activity    Alcohol use: Yes     Comment: social    Drug use: No    Sexual activity: Not on file   Other Topics Concern    Not on file   Social History Narrative    Not on file     Social Determinants of Health     Financial Resource Strain: Low Risk     Difficulty of Paying Living Expenses: Not hard at all   Food Insecurity: No Food Insecurity    Worried About Running Out of Food in the Last Year: Never true    [place]ev[place] of Food in the Last Year: Never true   Transportation Needs: No Transportation Needs    Lack of Transportation (Medical):  No  Lack of Transportation (Non-Medical): No   Physical Activity:     Days of Exercise per Week: Not on file    Minutes of Exercise per Session: Not on file   Stress:     Feeling of Stress : Not on file   Social Connections:     Frequency of Communication with Friends and Family: Not on file    Frequency of Social Gatherings with Friends and Family: Not on file    Attends Oriental orthodox Services: Not on file    Active Member of [address] [place] [address] or Organizations: Not on file    Attends Club or Organization Meetings: Not on file    Marital Status: Not on file   Intimate Partner Violence:     Fear of Current or Ex-Partner: Not on file    Emotionally Abused: Not on file    Physically Abused: Not on file    Sexually Abused: Not on file   Housing Stability:     Unable to Pay for Housing in the Last Year: Not on file    Number of [place] in the Last Year: Not on file    Unstable Housing in the Last Year: Not on file         REVIEW OF SYSTEMS:         Review of Systems   Constitutional: Negative for appetite change, fatigue and unexpected weight change. HENT: Negative. Eyes: Positive for visual disturbance. Respiratory: Negative. Cardiovascular: Negative. Gastrointestinal: Positive for abdominal distention, abdominal pain, anal bleeding, constipation, diarrhea and rectal pain. Negative for blood in stool, nausea and vomiting. Endocrine: Negative. Genitourinary: Negative. Musculoskeletal: Positive for back pain. Negative for neck stiffness. Skin: Negative. Neurological: Negative. Hematological: Negative. Psychiatric/Behavioral: Negative for sleep disturbance. The patient is nervous/anxious. PHYSICAL EXAMINATION:     Vital signs reviewed per the nursing documentation. /74   Pulse 87   Ht 5' (1.524 m)   Wt 208 lb 11.2 oz (94.7 kg)   SpO2 96%   BMI 40.76 kg/m²   Body mass index is 40.76 kg/m². Physical Exam  Constitutional:       Appearance: Normal appearance.    Eyes: General: No scleral icterus. Pupils: Pupils are equal, round, and reactive to light. Cardiovascular:      Rate and Rhythm: Normal rate and regular rhythm. Heart sounds: Normal heart sounds. Pulmonary:      Effort: Pulmonary effort is normal.      Breath sounds: Normal breath sounds. Abdominal:      General: Bowel sounds are normal. There is no distension. Palpations: Abdomen is soft. There is no mass. Tenderness: There is no abdominal tenderness. There is no guarding. Skin:     General: Skin is warm and dry. Coloration: Skin is not jaundiced. Neurological:      Mental Status: She is alert and oriented to person, place, and time. Mental status is at baseline. LABORATORY DATA: Reviewed  No results found for: WBC, HGB, HCT, MCV, PLT, NA, K, CL, CO2, BUN, CREATININE, LABPROT, LABALBU, GGT, BILITOT, ALKPHOS, AST, ALT, INR      No results found for: RBC, HGB, MCV, MCH, MCHC, RDW, MPV, BASOPCT, LYMPHSABS, MONOSABS, NEUTROABS, EOSABS, BASOSABS      DIAGNOSTIC TESTING:     No results found. Assessment  1. Irritable bowel syndrome with both constipation and diarrhea    2. Anxiety and depression        Plan    At present patient is stable  Still having 2-3 loose stools daily. Advised Imodium if greater than 2 loose stools daily. Increase Lexapro to 15mg daily  Advised stress reduction techniques like meditation, yoga. FODMAP diet advised. Follow-up 6 months. Thank you for allowing me to participate in the care of Ms. Tawnya Marr. For any further questions please do not hesitate to contact me. I have reviewed and agree with the ROS entered by the MA/LPN. Note is dictated utilizing voice recognition software. Unfortunately this leads to occasional typographical errors. Please contact our office if you have any questions    GI attending physician note. Patient seen with APRN   I independently performed an evaluation on Arnaldo Ferrari.   I have reviewed the above documentation completed by the Nurse Practitioner and agree with the history, examination, and management plan. Recommendations discussed.                    Jesus Ramos MD,FACP, [facility]  Board Certified in Gastroenterology and [address] [place] [address] Gastroenterology  Office #: [phone]

## 2021-11-26 DIAGNOSIS — K58.2 IRRITABLE BOWEL SYNDROME WITH BOTH CONSTIPATION AND DIARRHEA: ICD-10-CM

## 2022-02-02 ENCOUNTER — TELEPHONE (OUTPATIENT)
Dept: GASTROENTEROLOGY | Age: 27
End: 2022-02-02

## 2022-02-02 DIAGNOSIS — R12 HEARTBURN: Primary | ICD-10-CM

## 2022-02-02 NOTE — TELEPHONE ENCOUNTER
Patient called the office on 1/28/22 and left a message stating that she would like to know this the doctor soul give her something for heartburn. Patient was seen on 11/12/21 and patient is not currently on any PPI. Writer will route this message to Angie to get any recommendations and contact the patient.

## 2022-02-03 RX ORDER — OMEPRAZOLE 20 MG/1
20 CAPSULE, DELAYED RELEASE ORAL
Qty: 60 CAPSULE | Refills: 0 | Status: ON HOLD | OUTPATIENT
Start: 2022-02-03 | End: 2022-05-24 | Stop reason: ALTCHOICE

## 2022-02-03 NOTE — TELEPHONE ENCOUNTER
Writer called the back and left a message to inform the patient that Angie sent in a script for omeprazole 20 mg. She is to take 1 tablet 2 times daily before meals. Patient was told to call the office back.

## 2022-02-03 NOTE — TELEPHONE ENCOUNTER
Writer called the patient on 2/3/22 and gave her [facility] recommendations. Patient asked if she could get a script called in for omeprazole. She stated that the hospital prescribed it to her at a previous visit and stated that is worked for the heartburn. Writer stated she will send a message and get approval  from [facility] to order the medication. Patient verbalized understanding and thanked the writer.   Writer routed message to [facility] and pended the medication for approval.

## 2022-03-02 ENCOUNTER — OFFICE VISIT (OUTPATIENT)
Dept: GASTROENTEROLOGY | Age: 27
End: 2022-03-02
Payer: MEDICARE

## 2022-03-02 VITALS
WEIGHT: 213.8 LBS | HEART RATE: 90 BPM | DIASTOLIC BLOOD PRESSURE: 71 MMHG | BODY MASS INDEX: 41.75 KG/M2 | OXYGEN SATURATION: 96 % | SYSTOLIC BLOOD PRESSURE: 117 MMHG

## 2022-03-02 DIAGNOSIS — R13.10 DYSPHAGIA, UNSPECIFIED TYPE: ICD-10-CM

## 2022-03-02 DIAGNOSIS — K59.00 CONSTIPATION, UNSPECIFIED CONSTIPATION TYPE: ICD-10-CM

## 2022-03-02 DIAGNOSIS — K21.9 GASTROESOPHAGEAL REFLUX DISEASE, UNSPECIFIED WHETHER ESOPHAGITIS PRESENT: Primary | ICD-10-CM

## 2022-03-02 PROCEDURE — G8484 FLU IMMUNIZE NO ADMIN: HCPCS | Performed by: NURSE PRACTITIONER

## 2022-03-02 PROCEDURE — 1036F TOBACCO NON-USER: CPT | Performed by: NURSE PRACTITIONER

## 2022-03-02 PROCEDURE — G8417 CALC BMI ABV UP PARAM F/U: HCPCS | Performed by: NURSE PRACTITIONER

## 2022-03-02 PROCEDURE — 99214 OFFICE O/P EST MOD 30 MIN: CPT | Performed by: NURSE PRACTITIONER

## 2022-03-02 PROCEDURE — G8427 DOCREV CUR MEDS BY ELIG CLIN: HCPCS | Performed by: NURSE PRACTITIONER

## 2022-03-02 RX ORDER — DULOXETIN HYDROCHLORIDE 30 MG/1
CAPSULE, DELAYED RELEASE ORAL
COMMUNITY
Start: 2022-02-09 | End: 2022-04-29

## 2022-03-02 ASSESSMENT — ENCOUNTER SYMPTOMS
CONSTIPATION: 1
VOMITING: 0
SORE THROAT: 1
VOICE CHANGE: 0
DIARRHEA: 0
ABDOMINAL PAIN: 1
TROUBLE SWALLOWING: 0
ANAL BLEEDING: 1
BACK PAIN: 1
BLOOD IN STOOL: 0
CHOKING: 0
NAUSEA: 0
COUGH: 1
SHORTNESS OF BREATH: 1
ABDOMINAL DISTENTION: 1
RECTAL PAIN: 1

## 2022-03-02 NOTE — PROGRESS NOTES
GI CLINIC FOLLOW UP    INTERVAL HISTORY:   No referring provider defined for this encounter. No chief complaint on file. HISTORY OF PRESENT ILLNESS:     Patient being seen for ER follow-up. Patient was recently at hospital with epigastric pain, dysphagia. Patient states that 2 weeks prior she was having episodes where her throat felt as if it was closing. She has been on PPI therapy over the last several weeks. Still reports some intermittent breakthrough heartburns. Denies any extra esophageal manifestations of GERD. She is overweight. Reports occasionally has difficulty swallowing solids. Denies any issues with liquids. Reports intermittent constipation. Denies any melena, hematochezia. Patient does have significant anxiety. Currently on Cymbalta    Past Medical,Family, and Social History reviewed and does contribute to the patient presentingcondition. Patient's PMH/PSH,SH,PSYCH Hx, MEDs, ALLERGIES, and ROS were all reviewed and updated in the appropriate sections.     PAST MEDICAL HISTORY:  Past Medical History:   Diagnosis Date    Anxiety     Depression     Insulin resistance     PCOS (polycystic ovarian syndrome)     Ulnar neuropathy        Past Surgical History:   Procedure Laterality Date    ULNAR TUNNEL RELEASE Left     WISDOM TOOTH EXTRACTION         CURRENT MEDICATIONS:    Current Outpatient Medications:     DULoxetine (CYMBALTA) 30 MG extended release capsule, take 1 capsule by mouth every morning, Disp: , Rfl:     metoprolol tartrate (LOPRESSOR) 25 MG tablet, Take 12.5 mg by mouth 2 times daily, Disp: , Rfl:     omeprazole (PRILOSEC) 20 MG delayed release capsule, Take 1 capsule by mouth 2 times daily (before meals), Disp: 60 capsule, Rfl: 0    fluticasone (FLONASE) 50 MCG/ACT nasal spray, , Disp: , Rfl:     lidocaine (LIDODERM) 5 %, Place 1 patch onto the skin daily as needed for Pain 12 hours on, 12 hours off., Disp: , Rfl:     vitamin D3 (CHOLECALCIFEROL) 25 MCG (1000 UT) TABS tablet, Take 1 tablet by mouth daily, Disp: 30 tablet, Rfl: 11    Levocetirizine Dihydrochloride (XYZAL PO), Take by mouth, Disp: , Rfl:     gabapentin (NEURONTIN) 100 MG capsule, take 1 capsule by mouth three times a day, Disp: , Rfl:     acetaminophen (TYLENOL) 500 MG tablet, Take 2 tablets by mouth 3 times daily for 7 days (Patient not taking: Reported on 11/12/2021), Disp: 42 tablet, Rfl: 0    ALLERGIES:   Allergies   Allergen Reactions    Amoxicillin     Iv [Iodides] Hives    Keflex [Cephalexin]      Reaction unknown     Naproxen      Chest pain     Pcn [Penicillins] Hives       FAMILY HISTORY:       Problem Relation Age of Onset    COPD Mother     No Known Problems Father     Diabetes type 2  Maternal Grandmother     COPD Maternal Grandmother     Diabetes type 2  Maternal Grandfather     Hypertension Maternal Grandfather     Lung Cancer Paternal Grandmother          SOCIAL HISTORY:   Social History     Socioeconomic History    Marital status: Single     Spouse name: Not on file    Number of children: Not on file    Years of education: Not on file    Highest education level: Not on file   Occupational History    Not on file   Tobacco Use    Smoking status: Never Smoker    Smokeless tobacco: Never Used   Vaping Use    Vaping Use: Never used   Substance and Sexual Activity    Alcohol use: Yes     Comment: social    Drug use: No    Sexual activity: Not on file   Other Topics Concern    Not on file   Social History Narrative    Not on file     Social Determinants of Health     Financial Resource Strain: Low Risk     Difficulty of Paying Living Expenses: Not hard at all   Food Insecurity: No Food Insecurity    Worried About Running Out of Food in the Last Year: Never true    Kevyn of Food in the Last Year: Never true   Transportation Needs: No Transportation Needs    Lack of Transportation (Medical): No    Lack of Transportation (Non-Medical):  No   Physical Activity:     Days of Exercise per Week: Not on file    Minutes of Exercise per Session: Not on file   Stress:     Feeling of Stress : Not on file   Social Connections:     Frequency of Communication with Friends and Family: Not on file    Frequency of Social Gatherings with Friends and Family: Not on file    Attends Hoahaoism Services: Not on file    Active Member of [address] [place] [address] or Organizations: Not on file    Attends Club or Organization Meetings: Not on file    Marital Status: Not on file   Intimate Partner Violence:     Fear of Current or Ex-Partner: Not on file    Emotionally Abused: Not on file    Physically Abused: Not on file    Sexually Abused: Not on file   Housing Stability:     Unable to Pay for Housing in the Last Year: Not on file    Number of [place] in the Last Year: Not on file    Unstable Housing in the Last Year: Not on file       REVIEW OF SYSTEMS: A 12-point review of systemswas obtained and pertinent positives and negatives were enumerated above in the history of present illness. All other reviewed systems / symptoms were negative. Review of Systems   Constitutional: Negative for appetite change, fatigue and unexpected weight change. HENT: Positive for sore throat. Negative for dental problem, trouble swallowing and voice change. Eyes: Positive for visual disturbance. Respiratory: Positive for cough and shortness of breath. Negative for choking. Cardiovascular: Negative. Negative for chest pain and leg swelling. Gastrointestinal: Positive for abdominal distention, abdominal pain, anal bleeding, constipation (medication) and rectal pain. Negative for blood in stool, diarrhea, nausea and vomiting. Endocrine: Negative. Genitourinary: Negative. Negative for difficulty urinating. Musculoskeletal: Positive for back pain. Negative for joint swelling, myalgias and neck stiffness. Skin: Negative. Neurological: Negative.   Negative for dizziness, tremors, weakness, light-headedness, numbness and headaches. Hematological: Negative. Does not bruise/bleed easily. Psychiatric/Behavioral: Negative for sleep disturbance. The patient is nervous/anxious. PHYSICAL EXAMINATION: Vital signs reviewed per the nursing documentation. There were no vitals taken for this visit. There is no height or weight on file to calculate BMI. Physical Exam  Constitutional:       Appearance: Normal appearance. She is well-groomed and overweight. Eyes:      General: No scleral icterus. Pupils: Pupils are equal, round, and reactive to light. Cardiovascular:      Rate and Rhythm: Normal rate and regular rhythm. Heart sounds: Normal heart sounds. Pulmonary:      Effort: Pulmonary effort is normal.      Breath sounds: Normal breath sounds. Abdominal:      General: Bowel sounds are normal. There is no distension. Palpations: Abdomen is soft. There is no mass. Tenderness: There is no abdominal tenderness. There is no guarding. Skin:     General: Skin is warm and dry. Coloration: Skin is not jaundiced. Neurological:      Mental Status: She is alert and oriented to person, place, and time. Mental status is at baseline. Psychiatric:         Behavior: Behavior is cooperative. LABORATORY DATA: Reviewed  No results found for: WBC, HGB, HCT, MCV, PLT, NA, K, CL, CO2, BUN, CREATININE, LABPROT, LABALBU, GGT, BILITOT, ALKPHOS, AST, ALT, INR      No results found for: RBC, HGB, MCV, MCH, MCHC, RDW, MPV, BASOPCT, LYMPHSABS, MONOSABS, NEUTROABS, EOSABS, BASOSABS      DIAGNOSTIC TESTING:     No results found. IMPRESSION: Ms. Mitali Mccormick is a 23 y.o. female with    Diagnosis Orders   1. Gastroesophageal reflux disease, unspecified whether esophagitis present  EGD   2. Dysphagia, unspecified type  EGD     Patient continues to have symptoms of heartburns despite PPI therapy. Also has intermittent dysphagia with solids.   We will arrange EGD for evaluation. Continue PPI therapy  Continue antireflux measures. Patient has intermittent constipation. Increase fluids, increase fiber, stool softeners as needed. Follow-up the next 6 weeks      Thank you for allowing me to participate in the care of Ms. Katie Singh. For any further questions please do not hesitate to contact me. I have reviewed and agree with the ROS entered by the MA/ZAYDA.          Lenord Lefort, NP-C    [facility]  Office #: [phone]

## 2022-03-21 ENCOUNTER — HOSPITAL ENCOUNTER (OUTPATIENT)
Dept: PREADMISSION TESTING | Age: 27
Discharge: HOME | End: 2022-03-25

## 2022-03-21 VITALS — WEIGHT: 215 LBS | HEIGHT: 66 IN | BODY MASS INDEX: 34.55 KG/M2

## 2022-03-21 NOTE — PROGRESS NOTES
Dr. Pema Faria, anesthesia, was contacted and informed of patient's history of recent chest pain  and planned surgery. Cardiac Clearance clearance requested. Surgery scheduling will notify Dr. Freddy Bagley office who will be responsible for making sure the clearance is obtained and is in the chart for surgery.

## 2022-03-21 NOTE — PROGRESS NOTES
Pre-op Instructions For Out-Patient Surgery    Medication Instructions:  · Please stop herbs and any supplements now (includes vitamins and minerals). · Please contact your surgeon and prescribing physician for pre-op instructions for any blood thinners. · If you have inhalers/aerosol treatments at home, please use them the morning of your surgery and bring the inhalers with you to the hospital.    · Please take the following medications the morning of your surgery with a sip of water:    None Brittany Sykes has not started taking her Metoprolol as of yet. )     Surgery Instructions:  1. After midnight before surgery:  Do not eat or drink anything, including water, mints, gum, and hard candy. You may brush your teeth without swallowing. No smoking, chewing tobacco, or street drugs. 2. Please shower or bathe before surgery. 3. Please do not wear any cologne, lotion, powder, deodorant, jewelry, piercings, perfume, makeup, nail polish, hair accessories, or hair spray on the day of surgery. Wear loose comfortable clothing. 4. Leave your valuables at home. Bring a storage case for any glasses/contacts. 5. An adult who is responsible for you MUST drive you home and should be with you for the first 24 hours after surgery. 6. If having out-patient knee and foot surgeries, please arrange for planned crutches, walker, or wheelchair before arriving to the hospital.    The Day of Surgery:  · Arrive at [address] [place] [address] Surgery Entrance at the time directed by your surgeon and check in at the desk. · If you have a living will or healthcare power of , please bring a copy. · You will be taken to the pre-op holding area where you will be prepared for surgery. A physical assessment will be performed by a nurse practitioner or house officer.   Your IV will be started and you will meet your anesthesiologist.    · When you go to surgery, your family will be directed to the surgical waiting room, where the doctor should speak with them after your surgery. · After surgery, you will be taken to the recovery room then when you are awake and stable you will go to the short stay unit for preparation to be discharged. · If you use a Bi-PAP or C-PAP machine, please bring it with you and leave it in the car in case it is needed in recovery room. Instructions read to Chacha Appl and understanding verbalized.      4/4/22 EGD

## 2022-03-22 ENCOUNTER — TELEPHONE (OUTPATIENT)
Dept: GASTROENTEROLOGY | Age: 27
End: 2022-03-22

## 2022-03-22 NOTE — TELEPHONE ENCOUNTER
Pt called wanting to janeth egd do to cardiologist not approving clearance. Pt has testing 4/20/2022 would like to janeth egd for after.

## 2022-03-23 NOTE — TELEPHONE ENCOUNTER
Called pt; she states she has to have stress test and echo on 4/20/22. Wants to hold off on r/s'ing until she has clearance from cardiology. Pt advised to call the office once she f/u with cardiology.

## 2022-04-29 ENCOUNTER — OFFICE VISIT (OUTPATIENT)
Dept: GASTROENTEROLOGY | Age: 27
End: 2022-04-29
Payer: MEDICARE

## 2022-04-29 VITALS
SYSTOLIC BLOOD PRESSURE: 118 MMHG | BODY MASS INDEX: 34.01 KG/M2 | DIASTOLIC BLOOD PRESSURE: 76 MMHG | WEIGHT: 211.6 LBS | OXYGEN SATURATION: 97 % | HEIGHT: 66 IN | HEART RATE: 81 BPM

## 2022-04-29 DIAGNOSIS — F32.A ANXIETY AND DEPRESSION: ICD-10-CM

## 2022-04-29 DIAGNOSIS — F41.9 ANXIETY AND DEPRESSION: ICD-10-CM

## 2022-04-29 DIAGNOSIS — K59.00 CONSTIPATION, UNSPECIFIED CONSTIPATION TYPE: ICD-10-CM

## 2022-04-29 DIAGNOSIS — K21.9 GASTROESOPHAGEAL REFLUX DISEASE, UNSPECIFIED WHETHER ESOPHAGITIS PRESENT: Primary | ICD-10-CM

## 2022-04-29 PROCEDURE — 1036F TOBACCO NON-USER: CPT | Performed by: NURSE PRACTITIONER

## 2022-04-29 PROCEDURE — 99214 OFFICE O/P EST MOD 30 MIN: CPT | Performed by: NURSE PRACTITIONER

## 2022-04-29 PROCEDURE — G8417 CALC BMI ABV UP PARAM F/U: HCPCS | Performed by: NURSE PRACTITIONER

## 2022-04-29 PROCEDURE — G8427 DOCREV CUR MEDS BY ELIG CLIN: HCPCS | Performed by: NURSE PRACTITIONER

## 2022-04-29 RX ORDER — FAMOTIDINE 20 MG/1
20 TABLET, FILM COATED ORAL 2 TIMES DAILY
Qty: 60 TABLET | Refills: 3 | Status: SHIPPED | OUTPATIENT
Start: 2022-04-29 | End: 2022-08-22

## 2022-04-29 ASSESSMENT — ENCOUNTER SYMPTOMS
BACK PAIN: 1
SHORTNESS OF BREATH: 1
TROUBLE SWALLOWING: 0
NAUSEA: 0
ABDOMINAL DISTENTION: 1
COUGH: 1
DIARRHEA: 0
RECTAL PAIN: 1
VOMITING: 0
BLOOD IN STOOL: 0
VOICE CHANGE: 0
CHOKING: 0
CONSTIPATION: 1
ABDOMINAL PAIN: 1
SORE THROAT: 1
ANAL BLEEDING: 1

## 2022-04-29 NOTE — PROGRESS NOTES
GI CLINIC FOLLOW UP    INTERVAL HISTORY:   No referring provider defined for this encounter. Chief Complaint   Patient presents with    Gastroesophageal Reflux     Patient is here today due to acid reflux and would like to reschedule her EGD     HISTORY OF PRESENT ILLNESS:     Patient being seen for follow-up. Patient has hx of reflux, epigastric discomfort, chest pains. Recently had cardiac workup per anesthesia request that was negative per patient. Reports she taking omeprazole intermittently and it helps her but she has to stop taking occasionally because she reports significant constipation with the medication. Bowel movements satisfactory. Past Medical,Family, and Social History reviewed and does contribute to the patient presentingcondition. Patient's PMH/PSH,SH,PSYCH Hx, MEDs, ALLERGIES, and ROS were all reviewed and updated in the appropriate sections.     PAST MEDICAL HISTORY:  Past Medical History:   Diagnosis Date    Anxiety     Depression     Insulin resistance     PCOS (polycystic ovarian syndrome)     Pt denies having    Ulnar neuropathy        Past Surgical History:   Procedure Laterality Date    SKIN BIOPSY      Neg    ULNAR TUNNEL RELEASE Left     WISDOM TOOTH EXTRACTION         CURRENT MEDICATIONS:    Current Outpatient Medications:     famotidine (PEPCID) 20 MG tablet, Take 1 tablet by mouth 2 times daily, Disp: 60 tablet, Rfl: 3    omeprazole (PRILOSEC) 20 MG delayed release capsule, Take 1 capsule by mouth 2 times daily (before meals), Disp: 60 capsule, Rfl: 0    fluticasone (FLONASE) 50 MCG/ACT nasal spray, , Disp: , Rfl:     lidocaine (LIDODERM) 5 %, Place 1 patch onto the skin daily as needed for Pain 12 hours on, 12 hours off., Disp: , Rfl:     vitamin D3 (CHOLECALCIFEROL) 25 MCG (1000 UT) TABS tablet, Take 1 tablet by mouth daily, Disp: 30 tablet, Rfl: 11    Levocetirizine Dihydrochloride (XYZAL PO), Take by mouth, Disp: , Rfl:     DULoxetine (CYMBALTA) 30 MG extended release capsule, 20mg pt is weaning off at this time, Disp: , Rfl:     metoprolol tartrate (LOPRESSOR) 25 MG tablet, Take 12.5 mg by mouth 2 times daily (Patient not taking: Reported on 3/21/2022), Disp: , Rfl:     gabapentin (NEURONTIN) 100 MG capsule, take 1 capsule by mouth three times a day, Disp: , Rfl:     acetaminophen (TYLENOL) 500 MG tablet, Take 2 tablets by mouth 3 times daily for 7 days (Patient not taking: Reported on 11/12/2021), Disp: 42 tablet, Rfl: 0    ALLERGIES:   Allergies   Allergen Reactions    Amoxicillin     Iv [Iodides] Hives    Keflex [Cephalexin]      Reaction unknown     Naproxen      Chest pain     Pcn [Penicillins] Hives       FAMILY HISTORY:       Problem Relation Age of Onset    COPD Mother     No Known Problems Father     Diabetes type 2  Maternal Grandmother     COPD Maternal Grandmother     Diabetes type 2  Maternal Grandfather     Hypertension Maternal Grandfather     Lung Cancer Paternal Grandmother          SOCIAL HISTORY:   Social History     Socioeconomic History    Marital status: Single     Spouse name: Not on file    Number of children: Not on file    Years of education: Not on file    Highest education level: Not on file   Occupational History    Not on file   Tobacco Use    Smoking status: Never Smoker    Smokeless tobacco: Never Used   Vaping Use    Vaping Use: Former    Substances: Never   Substance and Sexual Activity    Alcohol use: Yes     Comment: social    Drug use: No    Sexual activity: Not on file   Other Topics Concern    Not on file   Social History Narrative    Not on file     Social Determinants of Health     Financial Resource Strain: Low Risk     Difficulty of Paying Living Expenses: Not hard at all   Food Insecurity: No Food Insecurity    Worried About Running Out of Food in the Last Year: Never true    Kevyn of Food in the Last Year: Never true   Transportation Needs: No Transportation Needs    Lack of Transportation (Medical): No    Lack of Transportation (Non-Medical): No   Physical Activity:     Days of Exercise per Week: Not on file    Minutes of Exercise per Session: Not on file   Stress:     Feeling of Stress : Not on file   Social Connections:     Frequency of Communication with Friends and Family: Not on file    Frequency of Social Gatherings with Friends and Family: Not on file    Attends [employer] Services: Not on file    Active Member of [address] [place] [address] [employer] or Organizations: Not on file    Attends Club or Organization Meetings: Not on file    Marital Status: Not on file   Intimate Partner Violence:     Fear of Current or Ex-Partner: Not on file    Emotionally Abused: Not on file    Physically Abused: Not on file    Sexually Abused: Not on file   Housing Stability:     Unable to Pay for Housing in the Last Year: Not on file    Number of [place] in the Last Year: Not on file    Unstable Housing in the Last Year: Not on file       REVIEW OF SYSTEMS: A 12-point review of systemswas obtained and pertinent positives and negatives were enumerated above in the history of present illness. All other reviewed systems / symptoms were negative. Review of Systems   Constitutional: Negative for appetite change, fatigue and unexpected weight change. HENT: Positive for sore throat. Negative for dental problem, trouble swallowing and voice change. Eyes: Positive for visual disturbance. Respiratory: Positive for cough and shortness of breath. Negative for choking. Cardiovascular: Negative. Negative for chest pain and leg swelling. Gastrointestinal: Positive for abdominal distention, abdominal pain, anal bleeding, constipation (medication) and rectal pain. Negative for blood in stool, diarrhea, nausea and vomiting. Endocrine: Negative. Genitourinary: Negative. Negative for difficulty urinating. Musculoskeletal: Positive for back pain.  Negative for joint swelling, myalgias and neck stiffness. Skin: Negative. Neurological: Negative. Negative for dizziness, tremors, weakness, light-headedness, numbness and headaches. Hematological: Negative. Does not bruise/bleed easily. Psychiatric/Behavioral: Negative for sleep disturbance. The patient is nervous/anxious. PHYSICAL EXAMINATION: Vital signs reviewed per the nursing documentation. /76   Pulse 81   Ht 5' 6\" (1.676 m)   Wt 211 lb 9.6 oz (96 kg)   SpO2 97%   BMI 34.15 kg/m²   Body mass index is 34.15 kg/m². Physical Exam  Constitutional:       Appearance: Normal appearance. Eyes:      General: No scleral icterus. Pupils: Pupils are equal, round, and reactive to light. Cardiovascular:      Rate and Rhythm: Normal rate and regular rhythm. Heart sounds: Normal heart sounds. Pulmonary:      Effort: Pulmonary effort is normal.      Breath sounds: Normal breath sounds. Abdominal:      General: Bowel sounds are normal. There is no distension. Palpations: Abdomen is soft. There is no mass. Tenderness: There is no abdominal tenderness. There is no guarding. Skin:     General: Skin is warm and dry. Coloration: Skin is not jaundiced. Neurological:      Mental Status: She is alert and oriented to person, place, and time. Mental status is at baseline. LABORATORY DATA: Reviewed  No results found for: WBC, HGB, HCT, MCV, PLT, NA, K, CL, CO2, BUN, CREATININE, LABPROT, LABALBU, GGT, BILITOT, ALKPHOS, AST, ALT, INR      No results found for: RBC, HGB, MCV, MCH, MCHC, RDW, MPV, BASOPCT, LYMPHSABS, MONOSABS, NEUTROABS, EOSABS, BASOSABS      DIAGNOSTIC TESTING:     No results found. IMPRESSION: Ms. Kwesi Hankins is a 33 y.o. female with    Diagnosis Orders   1. Gastroesophageal reflux disease, unspecified whether esophagitis present     2. Constipation, unspecified constipation type     3.  Anxiety and depression         Change to BID dosing Pepcid  Follow antireflux measures  Reschedule EGD; cleared by cardiology. Obtain clearance form. High fiber diet. Daily metamucil. Stool softeners as needed. The Endoscopic procedure was explained to the patient in detail  The prep and NPO were explained  All the Risks, Benefits, and Alternatives were explained  Risk of Bleeding, Perforation and Cardio Respiratory risks were explained  her questions were answered  The procedure has been scheduled with the  in the office  Patient was asked to give us a call for any questions  The patient has verbalized understanding and agreement to this plan. Thank you for allowing me to participate in the care of Ms. Nimisha Sorto. For any further questions please do not hesitate to contact me. I have reviewed and agree with the ROS entered by the MA/RICAN.          MARCOS Schneider    [facility]  Office #: [phone]

## 2022-05-09 ENCOUNTER — TELEPHONE (OUTPATIENT)
Dept: GASTROENTEROLOGY | Age: 27
End: 2022-05-09

## 2022-05-09 RX ORDER — VILAZODONE HYDROCHLORIDE 20 MG/1
30 TABLET ORAL DAILY
COMMUNITY
Start: 2022-04-20

## 2022-05-09 NOTE — TELEPHONE ENCOUNTER
Patient called office asking if she should be taking her Pepcid with Sallie Couch. Pt is advised to call pharmacy for interactions, and instructions on taking the medication if they need any spacing between doses.

## 2022-05-10 ENCOUNTER — HOSPITAL ENCOUNTER (OUTPATIENT)
Dept: PREADMISSION TESTING | Age: 27
Discharge: HOME | End: 2022-05-14

## 2022-05-10 VITALS — WEIGHT: 212 LBS | BODY MASS INDEX: 34.07 KG/M2 | HEIGHT: 66 IN

## 2022-05-10 NOTE — PROGRESS NOTES
Pre-op Instructions For Out-Patient Surgery    Medication Instructions:  · Please stop herbs and any supplements now (includes vitamins and minerals). · Please contact your surgeon and prescribing physician for pre-op instructions for any blood thinners. · If you have inhalers/aerosol treatments at home, please use them the morning of your surgery and bring the inhalers with you to the hospital.    · Please take the following medications the morning of your surgery with a sip of water:    None     Surgery Instructions:  1. After midnight before surgery:  Do not eat or drink anything, including water, mints, gum, and hard candy. You may brush your teeth without swallowing. No smoking, chewing tobacco, or street drugs. 2. Please shower or bathe before surgery. 3. Please do not wear any cologne, lotion, powder, deodorant, jewelry, piercings, perfume, makeup, nail polish, hair accessories, or hair spray on the day of surgery. Wear loose comfortable clothing. 4. Leave your valuables at home. Bring a storage case for any glasses/contacts. 5. An adult who is responsible for you MUST drive you home and should be with you for the first 24 hours after surgery. 6. If having out-patient knee and foot surgeries, please arrange for planned crutches, walker, or wheelchair before arriving to the hospital.    The Day of Surgery:  · Arrive at [address] [facility] Surgery Entrance at the time directed by your surgeon and check in at the desk. · If you have a living will or healthcare power of , please bring a copy. · You will be taken to the pre-op holding area where you will be prepared for surgery. A physical assessment will be performed by a nurse practitioner or house officer.   Your IV will be started and you will meet your anesthesiologist.    · When you go to surgery, your family will be directed to the surgical waiting room, where the doctor should speak with them after your surgery. · After surgery, you will be taken to the recovery room then when you are awake and stable you will go to the short stay unit for preparation to be discharged. · If you use a Bi-PAP or C-PAP machine, please bring it with you and leave it in the car in case it is needed in recovery room. Instructions read to Gato Ortiz and understanding verbalized.      5/24/22 EGD

## 2022-05-23 ENCOUNTER — ANESTHESIA EVENT (OUTPATIENT)
Dept: ENDOSCOPY | Age: 27
End: 2022-05-23
Payer: MEDICARE

## 2022-05-24 ENCOUNTER — ANESTHESIA (OUTPATIENT)
Dept: ENDOSCOPY | Age: 27
End: 2022-05-24
Payer: MEDICARE

## 2022-05-24 ENCOUNTER — HOSPITAL ENCOUNTER (OUTPATIENT)
Age: 27
Setting detail: OUTPATIENT SURGERY
Discharge: HOME | End: 2022-05-24
Attending: INTERNAL MEDICINE | Admitting: INTERNAL MEDICINE
Payer: MEDICARE

## 2022-05-24 VITALS
BODY MASS INDEX: 34.07 KG/M2 | SYSTOLIC BLOOD PRESSURE: 122 MMHG | WEIGHT: 212 LBS | HEIGHT: 66 IN | DIASTOLIC BLOOD PRESSURE: 78 MMHG | RESPIRATION RATE: 20 BRPM | TEMPERATURE: 98.7 F | HEART RATE: 85 BPM | OXYGEN SATURATION: 98 %

## 2022-05-24 PROBLEM — R00.2 PALPITATIONS: Status: ACTIVE | Noted: 2022-02-10

## 2022-05-24 PROBLEM — M54.12 CERVICAL RADICULOPATHY: Status: ACTIVE | Noted: 2018-10-12

## 2022-05-24 PROBLEM — G56.22 ULNAR NEUROPATHY AT ELBOW OF LEFT UPPER EXTREMITY: Status: ACTIVE | Noted: 2020-07-23

## 2022-05-24 LAB — HCG, PREGNANCY URINE (POC): NEGATIVE

## 2022-05-24 PROCEDURE — 88342 IMHCHEM/IMCYTCHM 1ST ANTB: CPT

## 2022-05-24 PROCEDURE — 43450 DILATE ESOPHAGUS 1/MULT PASS: CPT | Performed by: INTERNAL MEDICINE

## 2022-05-24 PROCEDURE — 2709999900 HC NON-CHARGEABLE SUPPLY: Performed by: INTERNAL MEDICINE

## 2022-05-24 PROCEDURE — 7100000011 HC PHASE II RECOVERY - ADDTL 15 MIN: Performed by: INTERNAL MEDICINE

## 2022-05-24 PROCEDURE — 7100000010 HC PHASE II RECOVERY - FIRST 15 MIN: Performed by: INTERNAL MEDICINE

## 2022-05-24 PROCEDURE — 3700000000 HC ANESTHESIA ATTENDED CARE: Performed by: INTERNAL MEDICINE

## 2022-05-24 PROCEDURE — 3609012400 HC EGD TRANSORAL BIOPSY SINGLE/MULTIPLE: Performed by: INTERNAL MEDICINE

## 2022-05-24 PROCEDURE — 81025 URINE PREGNANCY TEST: CPT

## 2022-05-24 PROCEDURE — 2580000003 HC RX 258: Performed by: ANESTHESIOLOGY

## 2022-05-24 PROCEDURE — 88305 TISSUE EXAM BY PATHOLOGIST: CPT

## 2022-05-24 PROCEDURE — 6360000002 HC RX W HCPCS: Performed by: NURSE ANESTHETIST, CERTIFIED REGISTERED

## 2022-05-24 PROCEDURE — 3700000001 HC ADD 15 MINUTES (ANESTHESIA): Performed by: INTERNAL MEDICINE

## 2022-05-24 PROCEDURE — 43239 EGD BIOPSY SINGLE/MULTIPLE: CPT | Performed by: INTERNAL MEDICINE

## 2022-05-24 RX ORDER — SODIUM CHLORIDE 0.9 % (FLUSH) 0.9 %
5-40 SYRINGE (ML) INJECTION PRN
Status: DISCONTINUED | OUTPATIENT
Start: 2022-05-24 | End: 2022-05-24 | Stop reason: HOSPADM

## 2022-05-24 RX ORDER — SODIUM CHLORIDE 9 MG/ML
INJECTION, SOLUTION INTRAVENOUS PRN
Status: DISCONTINUED | OUTPATIENT
Start: 2022-05-24 | End: 2022-05-24 | Stop reason: HOSPADM

## 2022-05-24 RX ORDER — SODIUM CHLORIDE 0.9 % (FLUSH) 0.9 %
5-40 SYRINGE (ML) INJECTION PRN
Status: CANCELLED | OUTPATIENT
Start: 2022-05-24

## 2022-05-24 RX ORDER — PROPOFOL 10 MG/ML
INJECTION, EMULSION INTRAVENOUS PRN
Status: DISCONTINUED | OUTPATIENT
Start: 2022-05-24 | End: 2022-05-24 | Stop reason: SDUPTHER

## 2022-05-24 RX ORDER — SODIUM CHLORIDE 0.9 % (FLUSH) 0.9 %
5-40 SYRINGE (ML) INJECTION EVERY 12 HOURS SCHEDULED
Status: CANCELLED | OUTPATIENT
Start: 2022-05-24

## 2022-05-24 RX ORDER — DIPHENHYDRAMINE HYDROCHLORIDE 50 MG/ML
12.5 INJECTION INTRAMUSCULAR; INTRAVENOUS
Status: CANCELLED | OUTPATIENT
Start: 2022-05-24 | End: 2022-05-24

## 2022-05-24 RX ORDER — LIDOCAINE HYDROCHLORIDE 10 MG/ML
1 INJECTION, SOLUTION EPIDURAL; INFILTRATION; INTRACAUDAL; PERINEURAL
Status: DISCONTINUED | OUTPATIENT
Start: 2022-05-24 | End: 2022-05-24 | Stop reason: HOSPADM

## 2022-05-24 RX ORDER — SODIUM CHLORIDE, SODIUM LACTATE, POTASSIUM CHLORIDE, CALCIUM CHLORIDE 600; 310; 30; 20 MG/100ML; MG/100ML; MG/100ML; MG/100ML
INJECTION, SOLUTION INTRAVENOUS CONTINUOUS
Status: DISCONTINUED | OUTPATIENT
Start: 2022-05-24 | End: 2022-05-24 | Stop reason: HOSPADM

## 2022-05-24 RX ORDER — SODIUM CHLORIDE 9 MG/ML
INJECTION, SOLUTION INTRAVENOUS PRN
Status: CANCELLED | OUTPATIENT
Start: 2022-05-24

## 2022-05-24 RX ORDER — FENTANYL CITRATE 50 UG/ML
25 INJECTION, SOLUTION INTRAMUSCULAR; INTRAVENOUS EVERY 5 MIN PRN
Status: CANCELLED | OUTPATIENT
Start: 2022-05-24

## 2022-05-24 RX ORDER — ALPRAZOLAM 0.25 MG/1
0.25 TABLET ORAL DAILY PRN
COMMUNITY
Start: 2022-05-02 | End: 2022-08-22

## 2022-05-24 RX ORDER — MIDAZOLAM HYDROCHLORIDE 1 MG/ML
INJECTION INTRAMUSCULAR; INTRAVENOUS PRN
Status: DISCONTINUED | OUTPATIENT
Start: 2022-05-24 | End: 2022-05-24 | Stop reason: SDUPTHER

## 2022-05-24 RX ORDER — SODIUM CHLORIDE 0.9 % (FLUSH) 0.9 %
5-40 SYRINGE (ML) INJECTION EVERY 12 HOURS SCHEDULED
Status: DISCONTINUED | OUTPATIENT
Start: 2022-05-24 | End: 2022-05-24 | Stop reason: HOSPADM

## 2022-05-24 RX ORDER — ONDANSETRON 2 MG/ML
4 INJECTION INTRAMUSCULAR; INTRAVENOUS
Status: CANCELLED | OUTPATIENT
Start: 2022-05-24 | End: 2022-05-24

## 2022-05-24 RX ORDER — FENTANYL CITRATE 50 UG/ML
50 INJECTION, SOLUTION INTRAMUSCULAR; INTRAVENOUS EVERY 5 MIN PRN
Status: CANCELLED | OUTPATIENT
Start: 2022-05-24

## 2022-05-24 RX ORDER — FENTANYL CITRATE 50 UG/ML
INJECTION, SOLUTION INTRAMUSCULAR; INTRAVENOUS PRN
Status: DISCONTINUED | OUTPATIENT
Start: 2022-05-24 | End: 2022-05-24 | Stop reason: SDUPTHER

## 2022-05-24 RX ADMIN — FENTANYL CITRATE 50 MCG: 50 INJECTION, SOLUTION INTRAMUSCULAR; INTRAVENOUS at 10:24

## 2022-05-24 RX ADMIN — PROPOFOL 50 MG: 10 INJECTION, EMULSION INTRAVENOUS at 10:29

## 2022-05-24 RX ADMIN — FENTANYL CITRATE 50 MCG: 50 INJECTION, SOLUTION INTRAMUSCULAR; INTRAVENOUS at 10:19

## 2022-05-24 RX ADMIN — SODIUM CHLORIDE, POTASSIUM CHLORIDE, SODIUM LACTATE AND CALCIUM CHLORIDE: 600; 310; 30; 20 INJECTION, SOLUTION INTRAVENOUS at 09:34

## 2022-05-24 RX ADMIN — PROPOFOL 50 MG: 10 INJECTION, EMULSION INTRAVENOUS at 10:22

## 2022-05-24 RX ADMIN — PROPOFOL 50 MG: 10 INJECTION, EMULSION INTRAVENOUS at 10:32

## 2022-05-24 RX ADMIN — PROPOFOL 30 MG: 10 INJECTION, EMULSION INTRAVENOUS at 10:26

## 2022-05-24 RX ADMIN — MIDAZOLAM 2 MG: 1 INJECTION INTRAMUSCULAR; INTRAVENOUS at 10:18

## 2022-05-24 ASSESSMENT — ENCOUNTER SYMPTOMS
CONSTIPATION: 0
RHINORRHEA: 0
ANAL BLEEDING: 0
WHEEZING: 0
DIARRHEA: 1
BLOOD IN STOOL: 0
SHORTNESS OF BREATH: 0
VOMITING: 0
NAUSEA: 0
SORE THROAT: 0
COUGH: 0
ABDOMINAL PAIN: 1
TROUBLE SWALLOWING: 0

## 2022-05-24 ASSESSMENT — PAIN SCALES - GENERAL
PAINLEVEL_OUTOF10: 6
PAINLEVEL_OUTOF10: 0

## 2022-05-24 ASSESSMENT — PAIN - FUNCTIONAL ASSESSMENT
PAIN_FUNCTIONAL_ASSESSMENT: ACTIVITIES ARE NOT PREVENTED
PAIN_FUNCTIONAL_ASSESSMENT: 0-10

## 2022-05-24 ASSESSMENT — PAIN DESCRIPTION - PAIN TYPE: TYPE: ACUTE PAIN

## 2022-05-24 ASSESSMENT — PAIN DESCRIPTION - ORIENTATION: ORIENTATION: LEFT;LOWER

## 2022-05-24 ASSESSMENT — PAIN DESCRIPTION - LOCATION: LOCATION: OTHER (COMMENT)
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
## 2022-05-24 NOTE — H&P
HISTORY and [facility] [address]       NAME:  Yaya Sutlana  MRN: [id number]   YOB: 1995   Date: 5/24/2022   Age: 33 y.o. Gender: female     COMPLAINT AND PRESENT HISTORY:   Yaya Sultana is 33 y.o.,  female, undergoing EGD ESOPHAGOGASTRODUODENOSCOPY for GERD DYSPHAGIA. SEE PORTION OF NOTE BELOW PER FRANTZ Jessica-JARRED 4-29-22 (REVIEWED):  Chief Complaint   Patient presents with    Gastroesophageal Reflux       Patient is here today due to acid reflux and would like to reschedule her EGD      HISTORY OF PRESENT ILLNESS:      Patient being seen for follow-up.     Patient has hx of reflux, epigastric discomfort, chest pains. Recently had cardiac workup per anesthesia request that was negative per patient. Reports she taking omeprazole intermittently and it helps her but she has to stop taking occasionally because she reports significant constipation with the medication.     Bowel movements satisfactory.     IMPRESSION: Ms. Amparo Dean is a 33 y.o. female with     Diagnosis Orders   1. Gastroesophageal reflux disease, unspecified whether esophagitis present      2. Constipation, unspecified constipation type      3. Anxiety and depression            Change to BID dosing Pepcid  Follow antireflux measures  Reschedule EGD; cleared by cardiology. Obtain clearance form.     High fiber diet. Daily metamucil. Stool softeners as needed. Review of additional significant medical hx:  PALPITATIONS, CHEST PAIN: Patient states she did receive cardiac clearance. Denies current chest pain, palpitations, SOB, dizziness, leg swelling, headache. Stress test (exercise only)    Anatomical Region Laterality Modality   Chest -- Nuclear Medicine       Narrative  Performed by Lenin Lazar, the patient's functional capacity was average. The stress ECG   was negative.  The calculated Duke Treadmill Score of 8.35 represents a low   risk only with regards to the exercise findings. Stress Findings   A Jorge protocol stress test was performed. Overall, the patient's exercise capacity was normal for their age. The patient reached stage 3 of the protocol after exercising for 8 min 21 sec. Patient achieved a maximal heart rate of 185 bpm (95% of maximum predicted heart rate). Patient achieved 10.16 METS. The patient experienced no angina during the test. The test was stopped because the patient experienced shortness of breath. The patient requested the test to be stopped. The patient reported fatigue and shortness of breath during the stress test. Onset of symptoms occurred at stage 3 of the protocol. Symptoms began at minute 7:30 during stress and ended at minute 1 during recovery. Blood pressure demonstrated a normal response and heart rate demonstrated a normal response to stress. The one minute heart rate recovery was 167 bpm. The two minute heart rate recovery was 140 bpm. PT. also experience anxiety at 7:30 min. exercise. .     ECG   Baseline ECG indicates sinus rhythm and normal ECG. There were no arrhythmias during stress. Overall, the patient's functional capacity was average. The stress ECG was negative. The calculated Duke Treadmill Score of 8.35 represents a low risk only with regards to the exercise findings. All Measurements                                                                                                   Exam End: 04/20/22  9:53 AM Last Resulted: 04/20/22 12:38 PM   Received From: [employer]  Result Received: 04/29/22 11:27 AM     ECHO, 4-20-22:  Narrative  Performed by Martir Headley  Left Ventricle: Systolic function is normal with an ejection fraction   of 60-65%.   Questionable hepatic lesion; recommend further dedicated imaging. NPO status: Patient states they have been NPO since before midnight. Medications taken TODAY (with sip of water): None.   Anticoagulation status: Patient denies taking any anti-coagulants, including aspirin currently. Pertinent family hx: Denies family hx of esophageal and colon CA except for maternal great uncle- Possible esophageal CA or lung CA. Denies personal hx of blood clots. Denies personal hx of MRSA infection. Denies any personal or family hx of previous complications w/anesthesia. PAST MEDICAL HISTORY     Past Medical History:   Diagnosis Date    Anxiety     Chest pain     Depression     GERD (gastroesophageal reflux disease)     Insulin resistance     Pt denies having    Palpitations     PCOS (polycystic ovarian syndrome)     Pt denies having    PTSD (post-traumatic stress disorder)     Tonsil stone     Hx of    Ulnar neuropathy        SURGICAL HISTORY       Past Surgical History:   Procedure Laterality Date    SKIN BIOPSY      Neg    ULNAR TUNNEL RELEASE Left     WISDOM TOOTH EXTRACTION         SOCIAL HISTORY       Social History     Socioeconomic History    Marital status: Single     Spouse name: None    Number of children: None    Years of education: None    Highest education level: None   Occupational History    None   Tobacco Use    Smoking status: Never Smoker    Smokeless tobacco: Never Used   Vaping Use    Vaping Use: Former    Substances: Never   Substance and Sexual Activity    Alcohol use: Yes     Comment: social    Drug use: No    Sexual activity: None   Other Topics Concern    None   Social History Narrative    None     Social Determinants of Health     Financial Resource Strain: Low Risk     Difficulty of Paying Living Expenses: Not hard at all   Food Insecurity: No Food Insecurity    Worried About Running Out of Food in the Last Year: Never true    Kevyn of Food in the Last Year: Never true   Transportation Needs: No Transportation Needs    Lack of Transportation (Medical): No    Lack of Transportation (Non-Medical):  No   Physical Activity:     Days of Exercise per Week: Not on file    Minutes of Exercise per Session: Not on file   Stress:  Feeling of Stress : Not on file   Social Connections:     Frequency of Communication with Friends and Family: Not on file    Frequency of Social Gatherings with Friends and Family: Not on file    Attends Restoration Services: Not on file    Active Member of Clubs or Organizations: Not on file    Attends Club or Organization Meetings: Not on file    Marital Status: Not on file   Intimate Partner Violence:     Fear of Current or Ex-Partner: Not on file    Emotionally Abused: Not on file    Physically Abused: Not on file    Sexually Abused: Not on file   Housing Stability:     Unable to Pay for Housing in the Last Year: Not on file    Number of [place] in the Last Year: Not on file    Unstable Housing in the Last Year: Not on file       REVIEW OF SYSTEMS      Allergies   Allergen Reactions    Amoxicillin     Iv [Iodides] Hives    Keflex [Cephalexin]      Reaction unknown     Naproxen      Chest pain     Pcn [Penicillins] Hives       No current facility-administered medications on file prior to encounter. Current Outpatient Medications on File Prior to Encounter   Medication Sig Dispense Refill    famotidine (PEPCID) 20 MG tablet Take 1 tablet by mouth 2 times daily 60 tablet 3    omeprazole (PRILOSEC) 20 MG delayed release capsule Take 1 capsule by mouth 2 times daily (before meals) (Patient not taking: Reported on 5/10/2022) 60 capsule 0    fluticasone (FLONASE) 50 MCG/ACT nasal spray       lidocaine (LIDODERM) 5 % Place 1 patch onto the skin daily as needed for Pain 12 hours on, 12 hours off.  vitamin D3 (CHOLECALCIFEROL) 25 MCG (1000 UT) TABS tablet Take 1 tablet by mouth daily 30 tablet 11    Levocetirizine Dihydrochloride (XYZAL PO) Take by mouth       (Notation: Medications listed above are not currently reconciled at the signing of this H&P note, to be reconciled in pre-op per RN)    Review of Systems   Constitutional: Negative for chills and fever.    HENT: Negative for congestion, ear pain, rhinorrhea, sore throat and trouble swallowing. Respiratory: Negative for cough, shortness of breath and wheezing. Cardiovascular: Negative for chest pain (Hx of- not current), palpitations (Hx of- not current) and leg swelling. Gastrointestinal: Positive for abdominal pain and diarrhea (Feels r/t anxiety). Negative for anal bleeding, blood in stool, constipation, nausea and vomiting. SEE HPI. Genitourinary: Negative for dysuria and frequency. Neurological: Negative for dizziness and headaches. Hematological: Does not bruise/bleed easily. Psychiatric/Behavioral: The patient is nervous/anxious. GENERAL PHYSICAL EXAM     Vitals: Review current vital signs per RN flow sheet. GENERAL APPEARANCE:   Luca Scruggs is 33 y.o.,  female, mildly obese, nourished, conscious, alert. Does not appear to be in any distress or pain at this time. Physical Exam  Constitutional:       General: She is not in acute distress. Appearance: She is well-developed. She is obese. She is not ill-appearing, toxic-appearing or diaphoretic. HENT:      Head: Normocephalic. Comments: Very slight uvula deviation to right- advised f/u with PCP. Right Ear: External ear normal.      Left Ear: External ear normal.      Nose: Nose normal.      Mouth/Throat:      Pharynx: No oropharyngeal exudate or posterior oropharyngeal erythema. Tonsils: No tonsillar abscesses. Eyes:      General:         Right eye: No discharge. Left eye: No discharge. Pupils: Pupils are equal, round, and reactive to light. Cardiovascular:      Rate and Rhythm: Regular rhythm. Tachycardia present. Pulses: Intact distal pulses. Heart sounds: Normal heart sounds. Pulmonary:      Effort: Pulmonary effort is normal. No accessory muscle usage or respiratory distress. Breath sounds: Normal breath sounds.  No decreased breath sounds, wheezing, rhonchi or rales.   Abdominal:      General: Bowel sounds are normal. There is no distension. Palpations: Abdomen is soft. There is no mass. Tenderness: There is no abdominal tenderness. There is no guarding or rebound. Musculoskeletal:      Right lower leg: No swelling or tenderness. Left lower leg: No swelling or tenderness. Comments: Negative Chiki's sign b/l. Skin:     General: Skin is warm and dry. Neurological:      Mental Status: She is alert and oriented to person, place, and time. Psychiatric:         Mood and Affect: Mood is anxious.          Behavior: Behavior normal.         RECENT LAB WORK    Contains abnormal data CBC auto differential  Specimen:  Blood (substance)   Ref Range & Units 5 d ago   White Blood Cells 4.0 - 11.0 X10E9/L 9.5    RBC count 3.80 - 5.20 X10E12/L 5.15    Hemoglobin 11.7 - 15.5 g/dL 13.5    Hematocrit 35 - 47 % 39.9    MCV 80 - 100 fL 78 Low     MCH 27 - 34 pg 26.2 Low     MCHC 32 - 36 g/dL 33.8    RDW 11.5 - 15.0 % 15.2 High     Platelets 304 - 026 K49O7/A 345    MPV 7 - 12 fL 7.5    % neutrophils % 65.9    % lymphocytes % 23.8    % monocytes % 5.8    % eosinophils % 3.8    % Basophils % 0.7    Neutrophils Absolute (A) 1.5 - 6.6 X10E9/L 6.3    Lymphocytes Absolute 1.0 - 3.5 X10E9/L 2.3    Monocytes Absolute 0 - 0.9 X10E9/L 0.5    Eosinophils Absolute 0.0 - 0.4 X10E9/L 0.4    Basophils Absolute 0.0 - 0.2 X10E9/L 0.1    Resulting [facility]   Specimen Collected: 05/19/22  5:42 PM Last Resulted: 05/19/22  8:10 PM   Received From: [employer]  Result Received: 05/24/22  8:06 AM    View Encounter        Recent Data from [employer]  Related to CBC auto differential  Component 05/19/22  08/12/21  10/22/20  03/26/20  05/03/18  05/03/18    White Blood Cells 9.5 7.7 8.2 8.3 -- 5.0   RBC count 5.15 5.01 4.91 5.02 -- 5.24 High    Hemoglobin 13.5 13.9 13.7 14.1 Negative 14.5   Hematocrit 39.9 40.6 40.3 42.2 -- 42.3   MCV 78 81 82 84 -- 81   MCH 26.2 27.7 27.8 28.2 -- 27.6   MCHC 33.8 34.2 33.9 33.5 -- 34.2   RDW 15.2 14.7 14.2 14.2 -- 14.0   Platelets 819 161 039 315 -- 279   MPV 7.5 7.6 7.5 7.5 -- 8.0   % neutrophils 65.9 77.0 72.1 -- -- 63.1   % lymphocytes 23.8 14.1 19.0 -- -- 27.0   % monocytes 5.8 5.7 5.8 -- -- 7.1   % eosinophils 3.8 2.3 2.3 -- -- 1.9   % Basophils 0.7 0.9 0.8 -- -- 0.9   Neutrophils Absolute (A) 6.3 5.9 5.9 -- -- 3.2   Lymphocytes Absolute 2.3 1.1 1.6 -- -- 1.4   Monocytes Absolute 0.5 0.4 0.5 -- -- 0.4   Eosinophils Absolute 0.4 0.2 0.2 -- -- 0.1   Basophils Absolute 0.1 0.1 0.1 -- -- 0.0   View all related data           Comprehensive metabolic panel  Specimen:  PLASMA   Ref Range & Units 5 d ago Comments   Sodium 134 - 146 mmol/L 140     Potassium, Bld 3.5 - 5.0 mmol/L 3.5     Chloride 98 - 109 mmol/L 104     CO2 22 - 32 mmol/L 26     Anion gap 5 - 15 mmol/L 10     BUN 5 - 23 mg/dL 8     Creatinine 0.40 - 1.00 mg/dL 0.78  METHOD TRACEABLE TO IDMS STANDARD   Glucose 65 - 99 mg/dL 92     Calcium 8.5 - 10.5 mg/dL 9.2     Total Protein 6.0 - 8.0 g/dL 7.4     Albumin 3.2 - 5.3 g/dL 4.4     Alkaline Phosphatase 39 - 130 U/L 69     AST 0 - 41 U/L 18     ALT 0 - 31 U/L 20     Total bilirubin 0.3 - 1.2 mg/dL 0.6     GFR MDRD Non Af Amer >59 ml/min/1.73sq.m >60     GFR MDRD Af Amer >59 ml/min/1.73sq.m >60     Resulting [facility]    Specimen Collected: 05/19/22  5:42 PM Last Resulted: 05/19/22  8:25 PM   Received From: [facility] [employer]  Result Received: 05/24/22  8:06 AM    View Encounter      Recent Data from [facility]  Related to Comprehensive metabolic panel  Component 05/19/22  08/12/21  10/22/20  03/26/20  05/03/18  12/05/17    Sodium 140 141 140 140 140 134   Potassium, Bld 3.5 3.7 3.6 3.8 4.0 3.2 Low    Chloride 104 107 108 105 105 101   CO2 26 24 23 23 25 19 Low    Anion gap 10 10 9 12  10  14 High     BUN 8 7 8 9 13 11   Creatinine 0.78  0.76  0.79  0.82  0.93  0.82

## 2022-05-24 NOTE — ANESTHESIA PRE PROCEDURE
Department of Anesthesiology  Preprocedure Note       Name:  Levi Sierra   Age:  33 y.o.  :  1995                                          MRN:  [id number]         Date:  2022      Surgeon: Dede Baumann):  Miri Cam MD    Procedure: Procedure(s):  EGD ESOPHAGOGASTRODUODENOSCOPY    Medications prior to admission:   Prior to Admission medications    Medication Sig Start Date End Date Taking? Authorizing Provider   ALPRAZolam (XANAX) 0.25 MG tablet Take 0.25 mg by mouth daily as needed. nightly 22   Historical Provider, MD   VILAZODONE HCL 10 MG Tablet Take 20 mg by mouth daily  22   Historical Provider, MD   famotidine (PEPCID) 20 MG tablet Take 1 tablet by mouth 2 times daily 22   FRANTZ Jorge - NP   fluticasone Monahan Burdock) 50 MCG/ACT nasal spray  21   Historical Provider, MD   lidocaine (LIDODERM) 5 % Place 1 patch onto the skin daily as needed for Pain 12 hours on, 12 hours off. Historical Provider, MD   vitamin D3 (CHOLECALCIFEROL) 25 MCG (1000 UT) TABS tablet Take 1 tablet by mouth daily 21   FRANTZ Watts - CNP   Levocetirizine Dihydrochloride (XYZAL PO) Take by mouth    Historical Provider, MD       Current medications:    Current Facility-Administered Medications   Medication Dose Route Frequency Provider Last Rate Last Admin    lidocaine PF 1 % injection 1 mL  1 mL IntraDERmal Once PRN Kylie Longoria MD        lactated ringers infusion   IntraVENous Continuous Kylie Longoria  mL/hr at 22 0950 NoRateChange at 22 0950    sodium chloride flush 0.9 % injection 5-40 mL  5-40 mL IntraVENous 2 times per day Kylie Longoria MD        sodium chloride flush 0.9 % injection 5-40 mL  5-40 mL IntraVENous PRN Kylie Longoria MD        0.9 % sodium chloride infusion   IntraVENous PRN Kylie Longoria MD           Allergies:     Allergies   Allergen Reactions    Amoxicillin     Iv [Iodides] Hives    Keflex [Cephalexin] Reaction unknown     Naproxen      Chest pain     Pcn [Penicillins] Hives       Problem List:    Patient Active Problem List   Diagnosis Code    Anxiety and depression F41.9, F32. A    Cervical radiculopathy M54.12    Palpitations R00.2    Ulnar neuropathy at elbow of left upper extremity G56.22       Past Medical History:        Diagnosis Date    Anxiety     Chest pain     Depression     GERD (gastroesophageal reflux disease)     Insulin resistance     Pt denies having    Palpitations     PCOS (polycystic ovarian syndrome)     Pt denies having    PTSD (post-traumatic stress disorder)     Tonsil stone     Hx of    Ulnar neuropathy        Past Surgical History:        Procedure Laterality Date    SKIN BIOPSY      Neg    ULNAR TUNNEL RELEASE Left     WISDOM TOOTH EXTRACTION         Social History:    Social History     Tobacco Use    Smoking status: Never Smoker    Smokeless tobacco: Never Used   Substance Use Topics    Alcohol use: Yes     Comment: social                                Counseling given: Not Answered      Vital Signs (Current):   Vitals:    05/24/22 0845 05/24/22 0905   BP:  121/84   Pulse: 104 91   Resp:  16   Temp:  97.8 °F (36.6 °C)   SpO2:  97%   Weight:  212 lb (96.2 kg)   Height:  5' 6\" (1.676 m)                                              BP Readings from Last 3 Encounters:   05/24/22 121/84   04/29/22 118/76   03/02/22 117/71       NPO Status: Time of last liquid consumption: 2330                        Time of last solid consumption: 2330                        Date of last liquid consumption: 05/23/22                        Date of last solid food consumption: 05/23/22    BMI:   Wt Readings from Last 3 Encounters:   05/24/22 212 lb (96.2 kg)   05/10/22 212 lb (96.2 kg)   04/29/22 211 lb 9.6 oz (96 kg)     Body mass index is 34.22 kg/m².     CBC: No results found for: WBC, RBC, HGB, HCT, MCV, RDW, PLT    CMP: No results found for: NA, K, CL, CO2, BUN, CREATININE, GFRAA, AGRATIO, LABGLOM, GLUCOSE, GLU, PROT, CALCIUM, BILITOT, ALKPHOS, AST, ALT    POC Tests: No results for input(s): POCGLU, POCNA, POCK, POCCL, POCBUN, POCHEMO, POCHCT in the last 72 hours. Coags: No results found for: PROTIME, INR, APTT    HCG (If Applicable):   Lab Results   Component Value Date    HCG NEGATIVE 05/24/2022        ABGs: No results found for: PHART, PO2ART, UVD3HLT, IBT8VPR, BEART, O3GQJJWK     Type & Screen (If Applicable):  No results found for: LABABO, LABRH    Drug/Infectious Status (If Applicable):  No results found for: HIV, HEPCAB    COVID-19 Screening (If Applicable): No results found for: COVID19        Anesthesia Evaluation  Patient summary reviewed and Nursing notes reviewed no history of anesthetic complications:   Airway: Mallampati: II  TM distance: >3 FB   Neck ROM: full  Mouth opening: > = 3 FB   Dental: normal exam         Pulmonary:Negative Pulmonary ROS and normal exam  breath sounds clear to auscultation                             Cardiovascular:            Rhythm: regular  Rate: normal                    Neuro/Psych:   (+) neuromuscular disease:, psychiatric history:            GI/Hepatic/Renal:   (+) GERD:,           Endo/Other:                     Abdominal:             Vascular: Other Findings:           Anesthesia Plan      general     ASA 2       Induction: intravenous. MIPS: Prophylactic antiemetics administered. Anesthetic plan and risks discussed with patient. Plan discussed with CRNA.                     Louis Bonilla MD   5/24/2022

## 2022-05-25 LAB — SURGICAL PATHOLOGY REPORT: NORMAL

## 2022-05-26 ENCOUNTER — TELEPHONE (OUTPATIENT)
Dept: GASTROENTEROLOGY | Age: 27
End: 2022-05-26

## 2022-06-09 ENCOUNTER — HOSPITAL ENCOUNTER (OUTPATIENT)
Age: 27
Setting detail: SPECIMEN
Discharge: HOME | End: 2022-06-09

## 2022-06-09 ENCOUNTER — HOSPITAL ENCOUNTER (OUTPATIENT)
Dept: CT IMAGING | Age: 27
Discharge: HOME | End: 2022-06-11
Payer: MEDICARE

## 2022-06-09 ENCOUNTER — OFFICE VISIT (OUTPATIENT)
Dept: FAMILY MEDICINE CLINIC | Age: 27
End: 2022-06-09
Payer: MEDICARE

## 2022-06-09 VITALS
WEIGHT: 203.8 LBS | OXYGEN SATURATION: 98 % | HEART RATE: 84 BPM | BODY MASS INDEX: 32.89 KG/M2 | SYSTOLIC BLOOD PRESSURE: 118 MMHG | TEMPERATURE: 97.4 F | DIASTOLIC BLOOD PRESSURE: 64 MMHG

## 2022-06-09 DIAGNOSIS — R10.9 FLANK PAIN: ICD-10-CM

## 2022-06-09 DIAGNOSIS — M79.10 MYALGIA: ICD-10-CM

## 2022-06-09 DIAGNOSIS — R35.0 URINARY FREQUENCY: ICD-10-CM

## 2022-06-09 DIAGNOSIS — R53.83 FATIGUE, UNSPECIFIED TYPE: ICD-10-CM

## 2022-06-09 DIAGNOSIS — J30.9 ALLERGIC RHINITIS, UNSPECIFIED SEASONALITY, UNSPECIFIED TRIGGER: ICD-10-CM

## 2022-06-09 DIAGNOSIS — R35.0 URINARY FREQUENCY: Primary | ICD-10-CM

## 2022-06-09 DIAGNOSIS — R68.83 CHILLS: ICD-10-CM

## 2022-06-09 LAB
BILIRUBIN, POC: NORMAL
BLOOD URINE, POC: NORMAL
CLARITY, POC: CLEAR
COLOR, POC: YELLOW
GLUCOSE URINE, POC: NORMAL
KETONES, POC: NORMAL
LEUKOCYTE EST, POC: NORMAL
NITRITE, POC: NORMAL
PH, POC: 5
PROTEIN, POC: NORMAL
SPECIFIC GRAVITY, POC: 1.01
UROBILINOGEN, POC: 0.2

## 2022-06-09 PROCEDURE — 74176 CT ABD & PELVIS W/O CONTRAST: CPT

## 2022-06-09 PROCEDURE — 1036F TOBACCO NON-USER: CPT | Performed by: NURSE PRACTITIONER

## 2022-06-09 PROCEDURE — G8427 DOCREV CUR MEDS BY ELIG CLIN: HCPCS | Performed by: NURSE PRACTITIONER

## 2022-06-09 PROCEDURE — 81025 URINE PREGNANCY TEST: CPT | Performed by: NURSE PRACTITIONER

## 2022-06-09 PROCEDURE — 81003 URINALYSIS AUTO W/O SCOPE: CPT | Performed by: NURSE PRACTITIONER

## 2022-06-09 PROCEDURE — 99214 OFFICE O/P EST MOD 30 MIN: CPT | Performed by: NURSE PRACTITIONER

## 2022-06-09 PROCEDURE — G8417 CALC BMI ABV UP PARAM F/U: HCPCS | Performed by: NURSE PRACTITIONER

## 2022-06-09 RX ORDER — FLUTICASONE PROPIONATE 50 MCG
2 SPRAY, SUSPENSION (ML) NASAL DAILY
Qty: 1 EACH | Refills: 11 | Status: SHIPPED | OUTPATIENT
Start: 2022-06-09

## 2022-06-09 RX ORDER — SULFAMETHOXAZOLE AND TRIMETHOPRIM 800; 160 MG/1; MG/1
1 TABLET ORAL 2 TIMES DAILY
Qty: 14 TABLET | Refills: 0 | Status: SHIPPED | OUTPATIENT
Start: 2022-06-09 | End: 2022-06-13

## 2022-06-09 ASSESSMENT — PATIENT HEALTH QUESTIONNAIRE - PHQ9
7. TROUBLE CONCENTRATING ON THINGS, SUCH AS READING THE NEWSPAPER OR WATCHING TELEVISION: 0
3. TROUBLE FALLING OR STAYING ASLEEP: 0
9. THOUGHTS THAT YOU WOULD BE BETTER OFF DEAD, OR OF HURTING YOURSELF: 0
6. FEELING BAD ABOUT YOURSELF - OR THAT YOU ARE A FAILURE OR HAVE LET YOURSELF OR YOUR FAMILY DOWN: 0
10. IF YOU CHECKED OFF ANY PROBLEMS, HOW DIFFICULT HAVE THESE PROBLEMS MADE IT FOR YOU TO DO YOUR WORK, TAKE CARE OF THINGS AT HOME, OR GET ALONG WITH OTHER PEOPLE: 0
2. FEELING DOWN, DEPRESSED OR HOPELESS: 0
SUM OF ALL RESPONSES TO PHQ QUESTIONS 1-9: 0
4. FEELING TIRED OR HAVING LITTLE ENERGY: 0
SUM OF ALL RESPONSES TO PHQ QUESTIONS 1-9: 0
8. MOVING OR SPEAKING SO SLOWLY THAT OTHER PEOPLE COULD HAVE NOTICED. OR THE OPPOSITE, BEING SO FIGETY OR RESTLESS THAT YOU HAVE BEEN MOVING AROUND A LOT MORE THAN USUAL: 0
5. POOR APPETITE OR OVEREATING: 0

## 2022-06-09 ASSESSMENT — ENCOUNTER SYMPTOMS
NAUSEA: 0
ALLERGIC/IMMUNOLOGIC NEGATIVE: 1
VOMITING: 0
COLOR CHANGE: 0
EYES NEGATIVE: 1
RESPIRATORY NEGATIVE: 1
DIARRHEA: 1
BACK PAIN: 1

## 2022-06-09 NOTE — PROGRESS NOTES
[facility]  [address] [facility]  Dept: [phone]    Niecy Pineda is a 33 y.o. female who presents today for her medical conditions/complaintsas noted below. Niecy Pineda is here today c/o Back Pain (started Monday), Urinary Frequency (cloudy. since yesterday), and Fatigue    Past Medical History:   Diagnosis Date    Anxiety     Chest pain     Depression     GERD (gastroesophageal reflux disease)     Insulin resistance     Pt denies having    Palpitations     PCOS (polycystic ovarian syndrome)     Pt denies having    PTSD (post-traumatic stress disorder)     Tonsil stone     Hx of    Ulnar neuropathy       Past Surgical History:   Procedure Laterality Date    SKIN BIOPSY      Neg    ULNAR TUNNEL RELEASE Left     UPPER GASTROINTESTINAL ENDOSCOPY N/A 5/24/2022    EGD DILATION [facility] WITH BIOPSIES performed by Darryn Dolan MD at [facility]         Family History   Problem Relation Age of Onset    COPD Mother     No Known Problems Father     Diabetes type 2  Maternal Grandmother     COPD Maternal Grandmother     Emphysema Maternal Grandmother     Diabetes type 2  Maternal Grandfather     Hypertension Maternal Grandfather     Lung Cancer Paternal Grandmother     Cancer Maternal Uncle         Possible esophageal CA or lung CA       Social History     Tobacco Use    Smoking status: Never Smoker    Smokeless tobacco: Never Used   Substance Use Topics    Alcohol use: Yes     Comment: social      Current Outpatient Medications   Medication Sig Dispense Refill    Multiple Vitamin (MULTIVITAMIN PO) Take by mouth daily      ALPRAZolam (XANAX) 0.25 MG tablet Take 0.25 mg by mouth daily as needed.  nightly      VILAZODONE HCL 20 MG Tablet Take 20 mg by mouth daily       famotidine (PEPCID) 20 MG tablet Take 1 tablet by mouth 2 times daily 60 tablet 3    fluticasone (FLONASE) 50 MCG/ACT nasal spray       lidocaine (LIDODERM) 5 % Place 1 patch onto the skin daily as needed for Pain 12 hours on, 12 hours off.  vitamin D3 (CHOLECALCIFEROL) 25 MCG (1000 UT) TABS tablet Take 1 tablet by mouth daily 30 tablet 11    Levocetirizine Dihydrochloride (XYZAL PO) Take by mouth       No current facility-administered medications for this visit. Allergies   Allergen Reactions    Amoxicillin     Iv [Iodides] Hives    Keflex [Cephalexin]      Reaction unknown     Naproxen      Chest pain     Pcn [Penicillins] Hives         HPI:     Urinary Frequency   This is a new problem. The current episode started in the past 7 days (Monday started w/ back pain, ongoing x 4 days). The problem occurs every urination. The problem has been rapidly worsening. The quality of the pain is described as aching. The pain is at a severity of 6/10. The pain is moderate. There has been no fever. She is sexually active. There is no history of pyelonephritis. Associated symptoms include chills (yesterday ), flank pain, frequency and urgency. Pertinent negatives include no discharge, hematuria, hesitancy, nausea, possible pregnancy, sweats or vomiting. She has tried acetaminophen and increased fluids for the symptoms. The treatment provided mild relief. There is no history of catheterization, kidney stones, recurrent UTIs, a single kidney, urinary stasis or a urological procedure. Health Maintenance:      Subjective:     Review of Systems   Constitutional: Positive for chills (yesterday ) and fatigue. Negative for appetite change, diaphoresis and fever. HENT: Negative. Eyes: Negative. Respiratory: Negative. Cardiovascular: Negative. Gastrointestinal: Positive for diarrhea. Negative for nausea and vomiting. Endocrine: Negative. Genitourinary: Positive for flank pain, frequency and urgency. Negative for difficulty urinating, dysuria, hematuria and hesitancy.         + odor    Musculoskeletal: Positive for back pain and myalgias. Skin: Negative. Negative for color change, pallor, rash and wound. Allergic/Immunologic: Negative. Neurological: Negative. Negative for seizures, syncope and facial asymmetry. Hematological: Negative. Psychiatric/Behavioral: Negative. Objective:     Vitals:    06/09/22 1600   BP: 118/64   Pulse: 84   Temp: 97.4 °F (36.3 °C)   SpO2: 98%       Body mass index is 32.89 kg/m². Physical Exam  Constitutional:       General: She is not in acute distress. Appearance: Normal appearance. She is well-developed. She is obese. She is ill-appearing. She is not toxic-appearing or diaphoretic. HENT:      Head: Normocephalic and atraumatic. Right Ear: External ear normal.      Left Ear: External ear normal.      Nose: Nose normal.   Eyes:      General: No scleral icterus. Right eye: No discharge. Left eye: No discharge. Conjunctiva/sclera: Conjunctivae normal.      Pupils: Pupils are equal, round, and reactive to light. Neck:      Trachea: No tracheal deviation. Cardiovascular:      Rate and Rhythm: Normal rate and regular rhythm. Heart sounds: Normal heart sounds. No murmur heard. No friction rub. No gallop. Pulmonary:      Effort: Pulmonary effort is normal. No tachypnea, accessory muscle usage or respiratory distress. Breath sounds: Normal breath sounds. No stridor. No decreased breath sounds, wheezing, rhonchi or rales. Abdominal:      General: Bowel sounds are normal. There is no distension. Palpations: Abdomen is soft. Tenderness: There is abdominal tenderness in the right lower quadrant, suprapubic area and left lower quadrant. There is right CVA tenderness and left CVA tenderness. Musculoskeletal:         General: No deformity. Normal range of motion. Cervical back: Normal range of motion and neck supple. Lumbar back: Tenderness (paraspinal) present. No swelling, edema or signs of trauma.    Skin:     General: Skin is warm and dry. Coloration: Skin is not pale. Findings: No erythema or rash. Neurological:      Mental Status: She is alert and oriented to person, place, and time. GCS: GCS eye subscore is 4. GCS verbal subscore is 5. GCS motor subscore is 6. Gait: Gait is intact. Gait normal.   Psychiatric:         Speech: Speech normal.         Behavior: Behavior normal.         Thought Content: Thought content normal.         Judgment: Judgment normal.           Assessment:         1. Urinary frequency    2. Flank pain    3. Chills    4. Fatigue, unspecified type    5. Myalgia    6. Allergic rhinitis, unspecified seasonality, unspecified trigger        Plan:     1. Urinary frequency    - POCT Urinalysis No Micro (Auto)  - POCT HCG, Prenancy, Ur  - Culture, Urine; Future  - CT ABDOMEN WO CONTRAST Additional Contrast? None; Future  - sulfamethoxazole-trimethoprim (BACTRIM DS;SEPTRA DS) 800-160 MG per tablet; Take 1 tablet by mouth 2 times daily for 7 days  Dispense: 14 tablet; Refill: 0    DD: UTI, pyelo, kidney stone   Urine not definitive for infection, send for culture   Treat with Bactrim due to sx  Defer IM injection due to PCN allergy  STAT CT  Office will call with results of above / further plan of care    2. Flank pain    - POCT Urinalysis No Micro (Auto)  - POCT HCG, Prenancy, Ur  - Culture, Urine; Future  - CT ABDOMEN WO CONTRAST Additional Contrast? None; Future  - sulfamethoxazole-trimethoprim (BACTRIM DS;SEPTRA DS) 800-160 MG per tablet; Take 1 tablet by mouth 2 times daily for 7 days  Dispense: 14 tablet; Refill: 0    3. Chills    - POCT Urinalysis No Micro (Auto)  - POCT HCG, Prenancy, Ur  - Culture, Urine; Future  - CT ABDOMEN WO CONTRAST Additional Contrast? None; Future  - sulfamethoxazole-trimethoprim (BACTRIM DS;SEPTRA DS) 800-160 MG per tablet; Take 1 tablet by mouth 2 times daily for 7 days  Dispense: 14 tablet; Refill: 0    4.  Fatigue, unspecified type    - POCT Urinalysis No Micro (Auto)  - POCT HCG, Prenancy, Ur  - Culture, Urine; Future  - CT ABDOMEN WO CONTRAST Additional Contrast? None; Future  - sulfamethoxazole-trimethoprim (BACTRIM DS;SEPTRA DS) 800-160 MG per tablet; Take 1 tablet by mouth 2 times daily for 7 days  Dispense: 14 tablet; Refill: 0    5. Myalgia    - CT ABDOMEN WO CONTRAST Additional Contrast? None; Future    6. Allergic rhinitis, unspecified seasonality, unspecified trigger    - fluticasone (FLONASE) 50 MCG/ACT nasal spray; 2 sprays by Nasal route daily  Dispense: 1 each; Refill: 11      Discussed use, benefit, and side effects of prescribed medications. All patient questions answered. Pt voiced understanding. Reviewed health maintenance. Instructed to continue current medications, diet and exercise. Patient agreedwith treatment plan. Follow up as directed.      Electronically signed by FRANTZ Arthur CNP on 6/9/2022

## 2022-06-10 ENCOUNTER — TELEPHONE (OUTPATIENT)
Dept: FAMILY MEDICINE CLINIC | Age: 27
End: 2022-06-10

## 2022-06-10 DIAGNOSIS — N20.0 KIDNEY STONE: Primary | ICD-10-CM

## 2022-06-10 LAB
CULTURE: NORMAL
SPECIMEN DESCRIPTION: NORMAL

## 2022-06-10 NOTE — TELEPHONE ENCOUNTER
Review CT please, also pt is stating its the medication that has her not sleeping, please call pt if its after 4:30

## 2022-06-10 NOTE — TELEPHONE ENCOUNTER
Patient calling stating she started taking the Bactrim yesterday and she couldn't sleep, was wondering if she could take it once daily instead of twice?

## 2022-06-10 NOTE — TELEPHONE ENCOUNTER
It is meant to be taken BID. I am still waiting on her urine culture results to determine if she truly has UTI. Her CT was negative for pyelo (no kidney infection).  It did show a stone in her R kidney but that should not cause her any pain until the stone passes but if she'd like to see Urologist I can place a referral. Notify if questions

## 2022-06-13 ENCOUNTER — OFFICE VISIT (OUTPATIENT)
Dept: FAMILY MEDICINE CLINIC | Age: 27
End: 2022-06-13
Payer: MEDICARE

## 2022-06-13 VITALS
TEMPERATURE: 96.5 F | SYSTOLIC BLOOD PRESSURE: 124 MMHG | HEART RATE: 87 BPM | DIASTOLIC BLOOD PRESSURE: 82 MMHG | BODY MASS INDEX: 33.25 KG/M2 | WEIGHT: 206 LBS | OXYGEN SATURATION: 98 %

## 2022-06-13 DIAGNOSIS — M54.50 ACUTE BILATERAL LOW BACK PAIN WITHOUT SCIATICA: ICD-10-CM

## 2022-06-13 DIAGNOSIS — R31.29 MICROSCOPIC HEMATURIA: ICD-10-CM

## 2022-06-13 DIAGNOSIS — N20.0 KIDNEY STONE: Primary | ICD-10-CM

## 2022-06-13 DIAGNOSIS — R30.0 DYSURIA: ICD-10-CM

## 2022-06-13 PROCEDURE — 1036F TOBACCO NON-USER: CPT | Performed by: NURSE PRACTITIONER

## 2022-06-13 PROCEDURE — G8427 DOCREV CUR MEDS BY ELIG CLIN: HCPCS | Performed by: NURSE PRACTITIONER

## 2022-06-13 PROCEDURE — G8417 CALC BMI ABV UP PARAM F/U: HCPCS | Performed by: NURSE PRACTITIONER

## 2022-06-13 PROCEDURE — 99214 OFFICE O/P EST MOD 30 MIN: CPT | Performed by: NURSE PRACTITIONER

## 2022-06-13 ASSESSMENT — PATIENT HEALTH QUESTIONNAIRE - PHQ9
SUM OF ALL RESPONSES TO PHQ QUESTIONS 1-9: 0
5. POOR APPETITE OR OVEREATING: 0
6. FEELING BAD ABOUT YOURSELF - OR THAT YOU ARE A FAILURE OR HAVE LET YOURSELF OR YOUR FAMILY DOWN: 0
SUM OF ALL RESPONSES TO PHQ QUESTIONS 1-9: 0
9. THOUGHTS THAT YOU WOULD BE BETTER OFF DEAD, OR OF HURTING YOURSELF: 0
7. TROUBLE CONCENTRATING ON THINGS, SUCH AS READING THE NEWSPAPER OR WATCHING TELEVISION: 0
8. MOVING OR SPEAKING SO SLOWLY THAT OTHER PEOPLE COULD HAVE NOTICED. OR THE OPPOSITE, BEING SO FIGETY OR RESTLESS THAT YOU HAVE BEEN MOVING AROUND A LOT MORE THAN USUAL: 0
2. FEELING DOWN, DEPRESSED OR HOPELESS: 0
4. FEELING TIRED OR HAVING LITTLE ENERGY: 0
SUM OF ALL RESPONSES TO PHQ9 QUESTIONS 1 & 2: 0
1. LITTLE INTEREST OR PLEASURE IN DOING THINGS: 0
SUM OF ALL RESPONSES TO PHQ QUESTIONS 1-9: 0
3. TROUBLE FALLING OR STAYING ASLEEP: 0
10. IF YOU CHECKED OFF ANY PROBLEMS, HOW DIFFICULT HAVE THESE PROBLEMS MADE IT FOR YOU TO DO YOUR WORK, TAKE CARE OF THINGS AT HOME, OR GET ALONG WITH OTHER PEOPLE: 0
SUM OF ALL RESPONSES TO PHQ QUESTIONS 1-9: 0

## 2022-06-13 ASSESSMENT — ENCOUNTER SYMPTOMS
ABDOMINAL PAIN: 1
CHEST TIGHTNESS: 0
CONSTIPATION: 1
EYES NEGATIVE: 1
SORE THROAT: 0
ANAL BLEEDING: 0
VOMITING: 0
SHORTNESS OF BREATH: 0
WHEEZING: 0
COLOR CHANGE: 0
DIARRHEA: 0
BACK PAIN: 1
NAUSEA: 1
COUGH: 0
BLOOD IN STOOL: 0
RESPIRATORY NEGATIVE: 1

## 2022-06-13 NOTE — PROGRESS NOTES
[facility]  [address] [facility]  Dept: [phone]    Nanci Gage is a 33 y.o. female who presents today for her medical conditions/complaintsas noted below. Nanci Gage is here today c/o Back Pain (f/u)    Past Medical History:   Diagnosis Date    Anxiety     Chest pain     Depression     GERD (gastroesophageal reflux disease)     Insulin resistance     Pt denies having    Palpitations     PCOS (polycystic ovarian syndrome)     Pt denies having    PTSD (post-traumatic stress disorder)     Tonsil stone     Hx of    Ulnar neuropathy       Past Surgical History:   Procedure Laterality Date    SKIN BIOPSY      Neg    ULNAR TUNNEL RELEASE Left     UPPER GASTROINTESTINAL ENDOSCOPY N/A 5/24/2022    EGD DILATION [facility] WITH BIOPSIES performed by Charles Bearden MD at [facility]         Family History   Problem Relation Age of Onset    COPD Mother     No Known Problems Father     Diabetes type 2  Maternal Grandmother     COPD Maternal Grandmother     Emphysema Maternal Grandmother     Diabetes type 2  Maternal Grandfather     Hypertension Maternal Grandfather     Lung Cancer Paternal Grandmother     Cancer Maternal Uncle         Possible esophageal CA or lung CA       Social History     Tobacco Use    Smoking status: Never Smoker    Smokeless tobacco: Never Used   Substance Use Topics    Alcohol use: Yes     Comment: social      Current Outpatient Medications   Medication Sig Dispense Refill    Multiple Vitamin (MULTIVITAMIN PO) Take by mouth daily      fluticasone (FLONASE) 50 MCG/ACT nasal spray 2 sprays by Nasal route daily 1 each 11    ALPRAZolam (XANAX) 0.25 MG tablet Take 0.25 mg by mouth daily as needed.  nightly      VILAZODONE HCL 20 MG Tablet Take 20 mg by mouth daily       famotidine (PEPCID) 20 MG tablet Take 1 tablet by mouth 2 times daily 60 tablet 3    lidocaine (LIDODERM) 5 % Place 1 patch onto the skin daily as needed for Pain 12 hours on, 12 hours off.  vitamin D3 (CHOLECALCIFEROL) 25 MCG (1000 UT) TABS tablet Take 1 tablet by mouth daily 30 tablet 11    Levocetirizine Dihydrochloride (XYZAL PO) Take by mouth       No current facility-administered medications for this visit. Allergies   Allergen Reactions    Amoxicillin     Iv [Iodides] Hives    Keflex [Cephalexin]      Reaction unknown     Naproxen      Chest pain     Pcn [Penicillins] Hives         HPI:     HPI    Presents today c/o follow-up back pain   Symptoms ongoing x 1 week  Symptoms gradually improving   Reports bilateral low back pain that radiates into bilateral hips   Reports some dysuria which started today   Appetite is decreased , reports lower abdominal pain , nausea , & constipation (last BM today was hard)  Declines fever/chills, vaginal sores, leg pain, numbness, tingling , or urinary complaints  Last week had U/A, U/C & CT checked which were benign   Has tried OTC Tylenol with relief   Reports she was recently screened for STD's & declines further testing at this time  Not currently sexually active     Impression   Nonobstructing 3-mm left upper pole renal calculus.  No findings of acute   pyelonephritis.  Normal appearance of the urinary bladder. Specimen Information: Urine, clean catch         2 Result Notes    Component 6/9/22 1930   Specimen Description . CLEAN CATCH URINE    Culture NO SIGNIFICANT GROWTH         Component 6/9/22 1607   Color, UA yellow    Clarity, UA clear    Glucose, UA POC neg    Bilirubin, UA neg    Ketones, UA neg    Spec Grav, UA 1.010    Blood, UA POC trace    pH, UA 5.0    Protein, UA POC neg    Urobilinogen, UA 0.2    Leukocytes, UA neg    Nitrite, UA neg          Health Maintenance:      Subjective:     Review of Systems   Constitutional: Positive for appetite change (dec.) and fatigue. Negative for chills, diaphoresis and fever. HENT: Positive for congestion.  Negative for ear discharge and sore throat. Eyes: Negative. Respiratory: Negative. Negative for cough, chest tightness, shortness of breath and wheezing. Cardiovascular: Negative. Gastrointestinal: Positive for abdominal pain (mild), constipation and nausea. Negative for anal bleeding, blood in stool, diarrhea and vomiting. Endocrine: Negative. Genitourinary: Positive for dysuria. Negative for difficulty urinating, frequency, genital sores, hematuria and urgency. Musculoskeletal: Positive for arthralgias and back pain. Skin: Negative. Negative for color change, pallor, rash and wound. Allergic/Immunologic: Positive for environmental allergies. Neurological: Negative. Negative for syncope, facial asymmetry, weakness and numbness. Psychiatric/Behavioral: Positive for sleep disturbance. Objective:     Vitals:    06/13/22 1311   BP: 124/82   Pulse: 87   Temp: (!) 96.5 °F (35.8 °C)   SpO2: 98%       Body mass index is 33.25 kg/m². Physical Exam  Constitutional:       General: She is not in acute distress. Appearance: Normal appearance. She is well-developed. She is obese. She is not ill-appearing, toxic-appearing or diaphoretic. HENT:      Head: Normocephalic and atraumatic. Right Ear: External ear normal.      Left Ear: External ear normal.      Nose: Nose normal.   Eyes:      General: No scleral icterus. Right eye: No discharge. Left eye: No discharge. Conjunctiva/sclera: Conjunctivae normal.      Pupils: Pupils are equal, round, and reactive to light. Neck:      Trachea: No tracheal deviation. Cardiovascular:      Rate and Rhythm: Normal rate and regular rhythm. Heart sounds: Normal heart sounds. No murmur heard. No friction rub. No gallop. Pulmonary:      Effort: Pulmonary effort is normal. No tachypnea, accessory muscle usage or respiratory distress. Breath sounds: Normal breath sounds. No stridor.  No decreased breath sounds, wheezing, rhonchi or rales. Abdominal:      General: Bowel sounds are normal. There is no distension. Palpations: Abdomen is soft. Tenderness: There is no abdominal tenderness. There is no right CVA tenderness, left CVA tenderness or guarding. Musculoskeletal:         General: No deformity. Cervical back: Normal range of motion and neck supple. Lumbar back: Tenderness (paraspinal) present. No swelling, edema, deformity, signs of trauma, lacerations, spasms or bony tenderness. Normal range of motion. Positive right straight leg raise test and positive left straight leg raise test. No scoliosis. Skin:     General: Skin is warm and dry. Coloration: Skin is not pale. Findings: No erythema or rash. Neurological:      Mental Status: She is alert and oriented to person, place, and time. GCS: GCS eye subscore is 4. GCS verbal subscore is 5. GCS motor subscore is 6. Sensory: Sensation is intact. Motor: Motor function is intact. Gait: Gait normal.   Psychiatric:         Speech: Speech normal.         Behavior: Behavior normal.         Thought Content: Thought content normal.         Judgment: Judgment normal.           Assessment:         1. Kidney stone    2. Acute bilateral low back pain without sciatica    3. Microscopic hematuria    4. Dysuria        Plan:     1. Kidney stone    - RAF Kraus MD, Urology, [place]    Do not believe pain is secondary to stone due to location  Encouraged Urology f/u     2. Acute bilateral low back pain without sciatica    - RAF Kraus MD, Urology, [place]    Likely M/S in nature  Offered muscle relaxant versus prednisone - pt declined  Sx treatment recommended  F/u if worsened or fails to resolve     3. Microscopic hematuria    - RAF Kraus MD, Urology, [place]    4.  Dysuria    - RAF Kraus MD, Urology, [place]    Recommended repeat U/A & STI testing  Patient declined at this time    Discussed use, benefit, and side effects of prescribed medications. All patient questions answered. Pt voiced understanding. Reviewed health maintenance. Instructed to continue current medications, diet and exercise. Patient agreedwith treatment plan. Follow up as directed.      Electronically signed by FRANTZ Talley CNP on 6/13/2022

## 2022-06-24 ENCOUNTER — OFFICE VISIT (OUTPATIENT)
Dept: GASTROENTEROLOGY | Age: 27
End: 2022-06-24
Payer: MEDICARE

## 2022-06-24 VITALS
BODY MASS INDEX: 33.49 KG/M2 | HEART RATE: 88 BPM | DIASTOLIC BLOOD PRESSURE: 71 MMHG | WEIGHT: 207.5 LBS | SYSTOLIC BLOOD PRESSURE: 110 MMHG

## 2022-06-24 DIAGNOSIS — K21.9 GASTROESOPHAGEAL REFLUX DISEASE WITHOUT ESOPHAGITIS: ICD-10-CM

## 2022-06-24 DIAGNOSIS — K20.0 EOSINOPHILIC ESOPHAGITIS: Primary | ICD-10-CM

## 2022-06-24 PROCEDURE — G8427 DOCREV CUR MEDS BY ELIG CLIN: HCPCS | Performed by: NURSE PRACTITIONER

## 2022-06-24 PROCEDURE — 1036F TOBACCO NON-USER: CPT | Performed by: NURSE PRACTITIONER

## 2022-06-24 PROCEDURE — G8417 CALC BMI ABV UP PARAM F/U: HCPCS | Performed by: NURSE PRACTITIONER

## 2022-06-24 PROCEDURE — 99213 OFFICE O/P EST LOW 20 MIN: CPT | Performed by: NURSE PRACTITIONER

## 2022-06-24 RX ORDER — PANTOPRAZOLE SODIUM 40 MG/1
40 TABLET, DELAYED RELEASE ORAL
Qty: 90 TABLET | Refills: 3 | Status: SHIPPED | OUTPATIENT
Start: 2022-06-24

## 2022-06-24 ASSESSMENT — ENCOUNTER SYMPTOMS
NAUSEA: 0
ANAL BLEEDING: 1
RECTAL PAIN: 1
ABDOMINAL DISTENTION: 1
VOMITING: 0
CHOKING: 0
VOICE CHANGE: 0
COUGH: 1
BACK PAIN: 1
TROUBLE SWALLOWING: 0
DIARRHEA: 0
CONSTIPATION: 1
ABDOMINAL PAIN: 1
SORE THROAT: 1
BLOOD IN STOOL: 0
SHORTNESS OF BREATH: 1

## 2022-06-24 NOTE — PROGRESS NOTES
GI CLINIC FOLLOW UP    INTERVAL HISTORY:   No referring provider defined for this encounter. Chief Complaint   Patient presents with    Gastroesophageal Reflux     EGD done 05/24/2022 Pepcid helps but not always she takes lindsey seltzer when it is really bad.  Abdominal Pain     with bloating and when its bad the Lindsey selzer helps for both       HISTORY OF PRESENT ILLNESS:     Patient recently had EGD to evaluate GERD. Endoscopically no abnormalities noted that can account for the patient's symptoms. Patient did have Winkler dilatation of the esophagus with minimal stretch marks seen. Esophageal biopsies revealed eosinophils up to 45 per high-powered field. Patient has been taking Pepcid as needed for heartburn. She denies any dysphagia, odynophagia. Patient reports known allergies to soy    Bowel movements are satisfactory  Past Medical,Family, and Social History reviewed and does contribute to the patient presentingcondition. Patient's PMH/PSH,SH,PSYCH Hx, MEDs, ALLERGIES, and ROS were all reviewed and updated in the appropriate sections.     PAST MEDICAL HISTORY:  Past Medical History:   Diagnosis Date    Anxiety     Chest pain     Depression     GERD (gastroesophageal reflux disease)     Insulin resistance     Pt denies having    Palpitations     PCOS (polycystic ovarian syndrome)     Pt denies having    PTSD (post-traumatic stress disorder)     Tonsil stone     Hx of    Ulnar neuropathy        Past Surgical History:   Procedure Laterality Date    SKIN BIOPSY      Neg    ULNAR TUNNEL RELEASE Left     UPPER GASTROINTESTINAL ENDOSCOPY N/A 5/24/2022    EGD DILATION GEORGE WITH BIOPSIES performed by Joshua Velásquez MD at [facility]:    Current Outpatient Medications:     pantoprazole (PROTONIX) 40 MG tablet, Take 1 tablet by mouth every morning (before breakfast), Disp: 90 tablet, Rfl: 3    Multiple Vitamin  Not on file     Social Determinants of Health     Financial Resource Strain: Low Risk     Difficulty of Paying Living Expenses: Not hard at all   Food Insecurity: No Food Insecurity    Worried About Running Out of Food in the Last Year: Never true    Kevyn of Food in the Last Year: Never true   Transportation Needs: No Transportation Needs    Lack of Transportation (Medical): No    Lack of Transportation (Non-Medical): No   Physical Activity:     Days of Exercise per Week: Not on file    Minutes of Exercise per Session: Not on file   Stress:     Feeling of Stress : Not on file   Social Connections:     Frequency of Communication with Friends and Family: Not on file    Frequency of Social Gatherings with Friends and Family: Not on file    Attends Congregational Services: Not on file    Active Member of [address] [place] [address] [employer] Group or Organizations: Not on file    Attends Club or Organization Meetings: Not on file    Marital Status: Not on file   Intimate Partner Violence:     Fear of Current or Ex-Partner: Not on file    Emotionally Abused: Not on file    Physically Abused: Not on file    Sexually Abused: Not on file   Housing Stability:     Unable to Pay for Housing in the Last Year: Not on file    Number of [place] in the Last Year: Not on file    Unstable Housing in the Last Year: Not on file       REVIEW OF SYSTEMS: A 12-point review of systemswas obtained and pertinent positives and negatives were enumerated above in the history of present illness. All other reviewed systems / symptoms were negative. Review of Systems   Constitutional: Negative for appetite change, fatigue and unexpected weight change. HENT: Positive for sore throat. Negative for dental problem, trouble swallowing and voice change. Eyes: Positive for visual disturbance. Respiratory: Positive for cough and shortness of breath. Negative for choking. Cardiovascular: Negative. Negative for chest pain and leg swelling. Gastrointestinal: Positive for abdominal distention, abdominal pain, anal bleeding, constipation (medication) and rectal pain. Negative for blood in stool, diarrhea, nausea and vomiting. Endocrine: Negative. Genitourinary: Negative. Negative for difficulty urinating. Musculoskeletal: Positive for back pain. Negative for joint swelling, myalgias and neck stiffness. Skin: Negative. Neurological: Negative. Negative for dizziness, tremors, weakness, light-headedness, numbness and headaches. Hematological: Negative. Does not bruise/bleed easily. Psychiatric/Behavioral: Negative for sleep disturbance. The patient is nervous/anxious. PHYSICAL EXAMINATION: Vital signs reviewed per the nursing documentation. /71   Pulse 88   Wt 207 lb 8 oz (94.1 kg)   BMI 33.49 kg/m²   Body mass index is 33.49 kg/m². Physical Exam  Constitutional:       Appearance: Normal appearance. Eyes:      General: No scleral icterus. Pupils: Pupils are equal, round, and reactive to light. Cardiovascular:      Rate and Rhythm: Normal rate and regular rhythm. Heart sounds: Normal heart sounds. Pulmonary:      Effort: Pulmonary effort is normal.      Breath sounds: Normal breath sounds. Abdominal:      General: Bowel sounds are normal. There is no distension. Palpations: Abdomen is soft. There is no mass. Tenderness: There is no abdominal tenderness. There is no guarding. Skin:     General: Skin is warm and dry. Coloration: Skin is not jaundiced. Neurological:      Mental Status: She is alert and oriented to person, place, and time. Mental status is at baseline.            LABORATORY DATA: Reviewed  No results found for: WBC, HGB, HCT, MCV, PLT, NA, K, CL, CO2, BUN, CREATININE, LABPROT, LABALBU, GGT, BILITOT, ALKPHOS, AST, ALT, INR      No results found for: RBC, HGB, MCV, MCH, MCHC, RDW, MPV, BASOPCT, LYMPHSABS, MONOSABS, NEUTROABS, EOSABS, BASOSABS      DIAGNOSTIC TESTING: CT ABDOMEN PELVIS WO CONTRAST Additional Contrast? None    Result Date: 6/9/2022  EXAMINATION: CT OF THE ABDOMEN AND PELVIS WITHOUT CONTRAST 6/9/2022 4:38 pm TECHNIQUE: CT of the abdomen and pelvis was performed without the administration of intravenous contrast. Multiplanar reformatted images are provided for review. Automated exposure control, iterative reconstruction, and/or weight based adjustment of the mA/kV was utilized to reduce the radiation dose to as low as reasonably achievable. COMPARISON: None. HISTORY: ORDERING SYSTEM PROVIDED HISTORY: Urinary frequency TECHNOLOGIST PROVIDED HISTORY: flank pain chills fatigue r/o stone, r/o pyelo Is the patient pregnant?->No Reason for Exam: flank pain chills fatigue r/o stone, r/o pyelo, Urinary frequency, Flank pain, Chills, Fatigue, unspecified type, Myalgia *order changed to abd/pel wo per Dr. Tasha Summers. FINDINGS: Lower Chest: Heart size is normal.  The visualized lung bases are clear. Organs: Small nonobstructing left upper pole renal calculus. The liver, spleen, pancreas, kidneys, gallbladder, and left adrenal gland otherwise have an unremarkable unenhanced CT appearance. The gallbladder is contracted. The right adrenal gland is normal in sized although contains dystrophic calcification which may be sequela of previous adrenal hemorrhage. No ureteral, bladder, or right renal calculi. No hydronephrosis or hydroureter. No perinephric inflammatory fat stranding. GI/Bowel: The stomach and the small and large bowel loops are normal in caliber, contour, and morphology, without acute or significant abnormality. No dilated loops or areas of bowel wall thickening. The appendix is normal. Peritoneum/Retroperitoneum: There is no free fluid or extraluminal gas. No enlarged or suspicious mesenteric or retroperitoneal lymphadenopathy. The abdominal aorta and iliac arteries are normal in caliber.  Pelvis: No pelvic free fluid or enlarged or suspicious pelvic or inguinal lymphadenopathy. No significant uterine or adnexal abnormality. The urinary bladder appears normal, without appreciable wall thickening. The pelvic bowel loops are unremarkable. Bones/Soft Tissues: No significant osseous abnormality. No abdominal wall or inguinal hernia. Nonobstructing 3-mm left upper pole renal calculus. No findings of acute pyelonephritis. Normal appearance of the urinary bladder. IMPRESSION: Ms. Renato Wells is a 33 y.o. female with    Diagnosis Orders   1. Eosinophilic esophagitis     2. Gastroesophageal reflux disease without esophagitis         Endoscopically EGD unremarkable. Biopsies did reveal eosinophils up to 45 per high-power field. Also had minimal stretch marks noted on Winkler dilatation. We will start her on PPI therapy. Follow-up in the next 4 to 6 months. Depending on her response we will decide further management. Thank you for allowing me to participate in the care of Ms. [place] Mcalester. For any further questions please do not hesitate to contact me. I have reviewed and agree with the ROS entered by the MA/RICAN.          MARCOS Wang    [facility]  Office #: [phone]

## 2022-08-22 ENCOUNTER — OFFICE VISIT (OUTPATIENT)
Dept: FAMILY MEDICINE CLINIC | Age: 27
End: 2022-08-22
Payer: MEDICARE

## 2022-08-22 VITALS
DIASTOLIC BLOOD PRESSURE: 80 MMHG | OXYGEN SATURATION: 96 % | WEIGHT: 205.6 LBS | HEART RATE: 96 BPM | TEMPERATURE: 97.5 F | SYSTOLIC BLOOD PRESSURE: 120 MMHG | BODY MASS INDEX: 33.18 KG/M2

## 2022-08-22 DIAGNOSIS — Z13.31 POSITIVE DEPRESSION SCREENING: ICD-10-CM

## 2022-08-22 DIAGNOSIS — G89.29 CHRONIC BILATERAL THORACIC BACK PAIN: Primary | ICD-10-CM

## 2022-08-22 DIAGNOSIS — G56.22 ULNAR NEUROPATHY AT ELBOW OF LEFT UPPER EXTREMITY: ICD-10-CM

## 2022-08-22 DIAGNOSIS — M54.6 CHRONIC BILATERAL THORACIC BACK PAIN: Primary | ICD-10-CM

## 2022-08-22 DIAGNOSIS — N93.9 ABNORMAL UTERINE BLEEDING (AUB): ICD-10-CM

## 2022-08-22 DIAGNOSIS — M79.602 LEFT ARM PAIN: ICD-10-CM

## 2022-08-22 PROCEDURE — G8417 CALC BMI ABV UP PARAM F/U: HCPCS | Performed by: NURSE PRACTITIONER

## 2022-08-22 PROCEDURE — 99214 OFFICE O/P EST MOD 30 MIN: CPT | Performed by: NURSE PRACTITIONER

## 2022-08-22 PROCEDURE — G8427 DOCREV CUR MEDS BY ELIG CLIN: HCPCS | Performed by: NURSE PRACTITIONER

## 2022-08-22 PROCEDURE — 1036F TOBACCO NON-USER: CPT | Performed by: NURSE PRACTITIONER

## 2022-08-22 RX ORDER — AZELASTINE HYDROCHLORIDE 137 UG/1
SPRAY, METERED NASAL 2 TIMES DAILY
COMMUNITY
Start: 2022-08-19

## 2022-08-22 RX ORDER — NORGESTIMATE AND ETHINYL ESTRADIOL 0.25-0.035
KIT ORAL DAILY
COMMUNITY
Start: 2022-08-20

## 2022-08-22 SDOH — ECONOMIC STABILITY: FOOD INSECURITY: WITHIN THE PAST 12 MONTHS, THE FOOD YOU BOUGHT JUST DIDN'T LAST AND YOU DIDN'T HAVE MONEY TO GET MORE.: NEVER TRUE

## 2022-08-22 SDOH — ECONOMIC STABILITY: FOOD INSECURITY: WITHIN THE PAST 12 MONTHS, YOU WORRIED THAT YOUR FOOD WOULD RUN OUT BEFORE YOU GOT MONEY TO BUY MORE.: NEVER TRUE

## 2022-08-22 ASSESSMENT — ENCOUNTER SYMPTOMS
DIARRHEA: 0
NAUSEA: 0
VOMITING: 0
EYES NEGATIVE: 1
GASTROINTESTINAL NEGATIVE: 1
BACK PAIN: 1
RESPIRATORY NEGATIVE: 1
COLOR CHANGE: 0
ALLERGIC/IMMUNOLOGIC NEGATIVE: 1

## 2022-08-22 ASSESSMENT — PATIENT HEALTH QUESTIONNAIRE - PHQ9
SUM OF ALL RESPONSES TO PHQ QUESTIONS 1-9: 10
SUM OF ALL RESPONSES TO PHQ9 QUESTIONS 1 & 2: 6
2. FEELING DOWN, DEPRESSED OR HOPELESS: 3
5. POOR APPETITE OR OVEREATING: 0
SUM OF ALL RESPONSES TO PHQ QUESTIONS 1-9: 10
3. TROUBLE FALLING OR STAYING ASLEEP: 0
SUM OF ALL RESPONSES TO PHQ QUESTIONS 1-9: 10
4. FEELING TIRED OR HAVING LITTLE ENERGY: 3
6. FEELING BAD ABOUT YOURSELF - OR THAT YOU ARE A FAILURE OR HAVE LET YOURSELF OR YOUR FAMILY DOWN: 1
SUM OF ALL RESPONSES TO PHQ QUESTIONS 1-9: 10
10. IF YOU CHECKED OFF ANY PROBLEMS, HOW DIFFICULT HAVE THESE PROBLEMS MADE IT FOR YOU TO DO YOUR WORK, TAKE CARE OF THINGS AT HOME, OR GET ALONG WITH OTHER PEOPLE: 0
7. TROUBLE CONCENTRATING ON THINGS, SUCH AS READING THE NEWSPAPER OR WATCHING TELEVISION: 0
9. THOUGHTS THAT YOU WOULD BE BETTER OFF DEAD, OR OF HURTING YOURSELF: 0
1. LITTLE INTEREST OR PLEASURE IN DOING THINGS: 3
8. MOVING OR SPEAKING SO SLOWLY THAT OTHER PEOPLE COULD HAVE NOTICED. OR THE OPPOSITE, BEING SO FIGETY OR RESTLESS THAT YOU HAVE BEEN MOVING AROUND A LOT MORE THAN USUAL: 0

## 2022-08-22 ASSESSMENT — SOCIAL DETERMINANTS OF HEALTH (SDOH): HOW HARD IS IT FOR YOU TO PAY FOR THE VERY BASICS LIKE FOOD, HOUSING, MEDICAL CARE, AND HEATING?: NOT HARD AT ALL

## 2022-08-22 NOTE — PATIENT INSTRUCTIONS
Extension Kinga Mercado, Neurology  [facility], [address], [address] [facility]  [address] [facility]etric[facility] and Gynecology - [address] [facility], [address] [facility], [address] [facility]  [phone]
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
## 2022-09-08 ENCOUNTER — OFFICE VISIT (OUTPATIENT)
Dept: FAMILY MEDICINE CLINIC | Age: 27
End: 2022-09-08
Payer: MEDICARE

## 2022-09-08 ENCOUNTER — HOSPITAL ENCOUNTER (OUTPATIENT)
Age: 27
Setting detail: SPECIMEN
Discharge: HOME | End: 2022-09-08

## 2022-09-08 VITALS
HEIGHT: 66 IN | HEART RATE: 95 BPM | SYSTOLIC BLOOD PRESSURE: 101 MMHG | BODY MASS INDEX: 32.85 KG/M2 | TEMPERATURE: 98.3 F | RESPIRATION RATE: 16 BRPM | OXYGEN SATURATION: 98 % | DIASTOLIC BLOOD PRESSURE: 71 MMHG | WEIGHT: 204.4 LBS

## 2022-09-08 DIAGNOSIS — Z11.52 ENCOUNTER FOR SCREENING FOR COVID-19: ICD-10-CM

## 2022-09-08 DIAGNOSIS — Z11.52 ENCOUNTER FOR SCREENING FOR COVID-19: Primary | ICD-10-CM

## 2022-09-08 PROBLEM — F43.10 PTSD (POST-TRAUMATIC STRESS DISORDER): Status: ACTIVE | Noted: 2021-07-02

## 2022-09-08 PROCEDURE — 99203 OFFICE O/P NEW LOW 30 MIN: CPT | Performed by: REGISTERED NURSE

## 2022-09-08 PROCEDURE — 1036F TOBACCO NON-USER: CPT | Performed by: REGISTERED NURSE

## 2022-09-08 PROCEDURE — G8427 DOCREV CUR MEDS BY ELIG CLIN: HCPCS | Performed by: REGISTERED NURSE

## 2022-09-08 PROCEDURE — G8417 CALC BMI ABV UP PARAM F/U: HCPCS | Performed by: REGISTERED NURSE

## 2022-09-08 ASSESSMENT — PATIENT HEALTH QUESTIONNAIRE - PHQ9
SUM OF ALL RESPONSES TO PHQ QUESTIONS 1-9: 2
8. MOVING OR SPEAKING SO SLOWLY THAT OTHER PEOPLE COULD HAVE NOTICED. OR THE OPPOSITE, BEING SO FIGETY OR RESTLESS THAT YOU HAVE BEEN MOVING AROUND A LOT MORE THAN USUAL: 0
4. FEELING TIRED OR HAVING LITTLE ENERGY: 0
SUM OF ALL RESPONSES TO PHQ9 QUESTIONS 1 & 2: 2
6. FEELING BAD ABOUT YOURSELF - OR THAT YOU ARE A FAILURE OR HAVE LET YOURSELF OR YOUR FAMILY DOWN: 0
2. FEELING DOWN, DEPRESSED OR HOPELESS: 1
5. POOR APPETITE OR OVEREATING: 0
7. TROUBLE CONCENTRATING ON THINGS, SUCH AS READING THE NEWSPAPER OR WATCHING TELEVISION: 0
SUM OF ALL RESPONSES TO PHQ QUESTIONS 1-9: 2
9. THOUGHTS THAT YOU WOULD BE BETTER OFF DEAD, OR OF HURTING YOURSELF: 0
3. TROUBLE FALLING OR STAYING ASLEEP: 0
1. LITTLE INTEREST OR PLEASURE IN DOING THINGS: 1
10. IF YOU CHECKED OFF ANY PROBLEMS, HOW DIFFICULT HAVE THESE PROBLEMS MADE IT FOR YOU TO DO YOUR WORK, TAKE CARE OF THINGS AT HOME, OR GET ALONG WITH OTHER PEOPLE: 1

## 2022-09-08 ASSESSMENT — ENCOUNTER SYMPTOMS
EYE PAIN: 0
FACIAL SWELLING: 0
SHORTNESS OF BREATH: 0
SORE THROAT: 0
EYES NEGATIVE: 1
NAUSEA: 0
DIARRHEA: 0
RHINORRHEA: 0
SINUS PRESSURE: 0
VOICE CHANGE: 0
COUGH: 1
ABDOMINAL DISTENTION: 0
VOMITING: 0
EYE REDNESS: 0
WHEEZING: 0
SINUS PAIN: 0

## 2022-09-08 NOTE — LETTER
[facility]  [address] [place] [id number]  Phone: [phone]  Fax: [phone], APRN - CNP        September 8, 2022     Patient: James Nicholas   YOB: 1995   Date of Visit: 9/8/2022       To Whom it May Concern:    James Nicholas was seen in my clinic on 9/8/2022. She may return to work on 9/12/2022. If you have any questions or concerns, please don't hesitate to call.     Sincerely,         Terri Miguel, FRANTZ - CNP

## 2022-09-08 NOTE — PROGRESS NOTES
[address]  [address]  [address] [address]  Dept: [phone]  Dept Fax: [phone]    Herminio Zhang is a 33 y.o. female who presents today for her medical conditions/complaints of   Chief Complaint   Patient presents with    Cough     Nasal congestion, chest congestion, fatigue, chills x 4 days; recently got back from a cruise and friend tested positive for covid          HPI:     /71 (Site: Right Upper Arm, Position: Sitting, Cuff Size: Medium Adult)   Pulse 95   Temp 98.3 °F (36.8 °C) (Temporal)   Resp 16   Ht 5' 6\" (1.676 m)   Wt 204 lb 6.4 oz (92.7 kg)   SpO2 98%   BMI 32.99 kg/m²       Cough  Associated symptoms include postnasal drip. Pertinent negatives include no chest pain, chills, eye redness, fever, rhinorrhea, sore throat, shortness of breath or wheezing. Patient presents with body aches, fatigue, nasal and chest congestion. Symptoms started 4 days ago. She was recently on a cruise, states she was around a friend who has [address]. Denies chest pain, palpitations, wheezing or shortness of breath. COVID19 vaccines: has had two doses. Took two at home COVID19 tests, one last this morning which she reports was negative. Has taken xyzal for her symptoms. Has taken Flonase and nasal antihistamine spray. These are reported to be moderately helpful.      Past Medical History:   Diagnosis Date    Anxiety     Chest pain     Depression     GERD (gastroesophageal reflux disease)     Insulin resistance     Pt denies having    Palpitations     PCOS (polycystic ovarian syndrome)     Pt denies having    PTSD (post-traumatic stress disorder)     Tonsil stone     Hx of    Ulnar neuropathy         Past Surgical History:   Procedure Laterality Date    SKIN BIOPSY      Neg    ULNAR TUNNEL RELEASE Left     UPPER GASTROINTESTINAL ENDOSCOPY N/A 5/24/2022    EGD JYOTI VAZQUEZ WITH BIOPSIES performed by Heber Warren MD at [facility] [address] EXTRACTION         Family History   Problem Relation Age of Onset    COPD Mother     No Known Problems Father     Diabetes type 2  Maternal Grandmother     COPD Maternal Grandmother     Emphysema Maternal Grandmother     Diabetes type 2  Maternal Grandfather     Hypertension Maternal Grandfather     Lung Cancer Paternal Grandmother     Cancer Maternal Uncle         Possible esophageal CA or lung CA       Social History     Tobacco Use    Smoking status: Never    Smokeless tobacco: Never   Substance Use Topics    Alcohol use: Yes     Comment: social        Prior to Visit Medications    Medication Sig Taking? Authorizing Provider   norgestimate-ethinyl estradiol (ORTHO-CYCLEN) 0.25-35 MG-MCG per tablet daily Yes Historical Provider, MD   Azelastine HCl 137 MCG/SPRAY SOLN 2 times daily Yes Historical Provider, MD   pantoprazole (PROTONIX) 40 MG tablet Take 1 tablet by mouth every morning (before breakfast) Yes FRANTZ Howard - NP   Multiple Vitamin (MULTIVITAMIN PO) Take by mouth daily Yes Historical Provider, MD   fluticasone (FLONASE) 50 MCG/ACT nasal spray 2 sprays by Nasal route daily Yes FRANTZ Soares - CNP   VILAZODONE HCL 20 MG Tablet Take 30 mg by mouth daily  Yes Historical Provider, MD   Levocetirizine Dihydrochloride (XYZAL PO) Take by mouth Yes Historical Provider, MD   lidocaine (LIDODERM) 5 % Place 1 patch onto the skin daily as needed for Pain 12 hours on, 12 hours off. Patient not taking: Reported on 9/8/2022  Historical Provider, MD       Allergies   Allergen Reactions    Amoxicillin     Iv [Iodides] Hives    Keflex [Cephalexin]      Reaction unknown     Naproxen      Chest pain     Pcn [Penicillins] Hives         Subjective:      Review of Systems   Constitutional:  Negative for chills and fever. HENT:  Positive for postnasal drip.  Negative for drooling, ear discharge, facial swelling, rhinorrhea, sinus pressure, sinus pain, guidelines reviewed. Rest, increase oral hydration. May continue Flonase for PND. Tylenol as needed for generalized body aches and fever. See ER for emergent concerns such as chest pain or shortness of breath. The COVID-19 test that was done today can take 1-6 days for results. Until then you should assume you have this disease and adhere to home isolation as described below. When we get the test results back, one of the following readings will be obtained. A positive test means you have the virus. 2.  An inconclusive test means it wasn't sure if you have the virus or not. An inconclusive test result is treated as a positive result and recommendations  are the same as a positive test result. We may ask you to repeat this test in this circumstance. 3.  A negative test means you probably do not have the virus, but it is not conclusive. If your results of the COVID-19 test is NEGATIVE -     The patient may stop isolation, in consultation with your health care provider, typically when, your healthcare provider has determined that the cause of the illness is NOT COVID-19 and approves your return to work. Please follow up with your physician for evaluation about this. 1/4/2022- the following website is the link to the CDC that discusses the new quarantine/isolation guidelines. https://www.gonzalez-kohler.net/. html#print    The point of quarantine guidelines is to minimize the spread of the COVID -19 virus to others, especially the high risk population. But, the new CDC guidelines published 1/4/22 does increase the risk of spreading the virus with the option of a shortened quarantine/isolation if leaving after the first 5 days. Please read the specific scenario that is applicable to you, listed below, to minimize the spread of this virus.       If your results of the COVID-19 test is POSITIVE- you must isolate yourself from at home and in public for 5 additional days (day 6 through day 10) after the end of your 5-day isolation period. If you are unable to wear a mask when around others, you should continue to isolate for a full 10 days. Avoid people who are immunocompromised or at high risk for severe disease, and nursing homes and other high-risk settings, until after at least 10 days. -Do not go to places where you are unable to wear a mask, such as restaurants and some gyms, and avoid eating around others at home and at work until after 10 days   If you continue to have fever or your other symptoms have not improved after 5 days of isolation, you should wait to end your isolation until you are fever-free for 24 hours without the use of fever-reducing medication and your other symptoms have improved. Continue to wear a well-fitting mask. Contact your healthcare provider if you have questions. Do not travel during your 5-day isolation period. After you end isolation, avoid travel until a full 10 days after your first day of symptoms. If you must travel on days 6-10, wear a well-fitting mask when you are around others for the entire duration of travel. If you are unable to wear a mask, you should not travel during the 10 days. Do not go to places where you are unable to wear a mask, such as restaurants and some gyms, and avoid eating around others at home and at work until a full 10 days after your first day of symptoms. If an individual has access to a test and wants to test, the best approach is to use an antigen test (see comment below) towards the end of the 5-day isolation period. Collect the test sample only if you are fever-free for 24 hours without the use of fever-reducing medication and your other symptoms have improved (loss of taste and smell may persist for weeks or months after recovery and need not delay the end of isolation). If your test result is positive, you should continue to isolate until day 10.  If your test result is negative,  you can end isolation, but continue to wear a well-fitting mask around others at home and in public until day 10. Follow additional recommendations for masking and restricting travel as described above. Note that these recommendations on ending isolation do not apply to people with severe COVID-19 or with weakened immune systems (immunocompromised). See section below for recommendations for when to end isolation for these groups. Comment - Negative results should be treated as presumptive. Negative results do not rule out SARS-CoV-2 infection and should not be used as the sole basis for treatment or patient management decisions, including infection control decisions. To improve results, antigen tests should be used twice over a three-day period with at least 24 hours and no more than 48 hours between tests. Scenario 2:    Ending isolation for people who tested positive for COVID-19 but had no symptoms  If you test positive for COVID-19 and never develop symptoms, isolate for at least 5 days. Day 0 is the day of your positive viral test (based on the date you were tested) and day 1 is the first full day after the specimen was collected for your positive test. You can leave isolation after 5 full days. If you continue to have no symptoms, you can end isolation after at least 5 days. You should continue to wear a well-fitting mask around others at home and in public until day 10 (day 6 through day 10). If you are unable to wear a well-fitting mask when around others, you should continue to isolate for 10 days.  Avoid people who are immunocompromised or at high risk for severe disease, and nursing homes and other high-risk settings, until after at least 10 days. -Do not go to places where you are unable to wear a mask, such as restaurants and some gyms, and avoid eating around others at home and at work until after 10 days   If you develop symptoms after testing positive, your 5-day isolation period should start over. Day 0 is your first day of symptoms. Follow the recommendations above for ending isolation for people who had COVID-19 and had symptoms. Do not travel during your 5-day isolation period. After you end isolation, avoid travel until 10 days after the day of your positive test. If you must travel on days 6-10, wear a well-fitting mask when you are around others for the entire duration of travel. If you are unable to wear a mask, you should not travel during the 10 days after your positive test.  Do not go to places where you are unable to wear a mask, such as restaurants and some gyms, and avoid eating around others at home and at work until 10 days after the day of your positive test.  If an individual has access to a test and wants to test, the best approach is to use an antigen test (see comment below) towards the end of the 5-day isolation period. If your test result is positive, you should continue to isolate until day 10. If your test result is negative, you can end isolation, but continue to wear a well-fitting mask around others at home and in public until day 10. Follow additional recommendations for masking and restricting travel described above. Comment-Negative results should be treated as presumptive. Negative results do not rule out SARS-CoV-2 infection and should not be used as the sole basis for treatment or patient management decisions, including infection control decisions. To improve results, antigen tests should be used twice over a three-day period with at least 24 hours and no more than 48 hours between tests. Scenario 3:    Ending isolation for people who were severely ill with COVID-19 or have a weakened immune system (immunocompromised)  People who are severely ill with COVID-19 (including those who were hospitalized or required intensive care or ventilation support) and people with compromised immune systems might need to isolate at home longer. They may also require testing with a viral test to determine when they can be around others. CDC recommends an isolation period of at least 10 and up to 20 days for people who were severely ill with COVID-19 and for people with weakened immune systems. Consult with your healthcare provider about when you can resume being around other people. People who are immunocompromised should talk to their healthcare provider about the potential for reduced immune responses to COVID-19 vaccines and the need to continue to follow? current prevention measures? (including wearing a well-fitting mask, staying 6 feet apart from others they dont live with, and avoiding crowds and poorly ventilated indoor spaces) to protect themselves against COVID-19 until advised otherwise by their healthcare provider. Close contacts of immunocompromised people - including household members - should also be encouraged to receive all recommended COVID-19 vaccine doses to help protect these people. COVID-19 EXPOSURE    Close Contact Definition:    Someone who was less than 6 feet away from an infected person (laboratory-confirmed or a clinical diagnosis) for a cumulative total of 15 minutes or more over a 24-hour period (for example, three individual 5-minute exposures for a total of 15 minutes). Scenario 4:    Who does NOT need to quarantine after COVID-19 exposure? 1. You are age 25 or older and have received all recommended vaccine doses, including boosters and additional primary shots for some immunocompromised people. 2. You are ages 5-17 years and completed the primary series of COVID-19 vaccines. 3. You had confirmed COVID-19 within the last 90 days (you tested positive using a viral test). You should wear a well-fitting mask around others for 10 days from the date of your last close contact with someone with COVID-19 (the date of last close contact is considered day 0).  Get tested at least 5 days after you last had last had close contact with someone with COVID-19.  -If you test negative, you can leave your home, but continue to wear a well-fitting mask when around others at home and in public until 10 days after your last close contact ith   someone with COVID-19.  -If you test positive, you should isolate for at least 5 days from the date of your positive test (if you do not have symptoms). If you do develop COVID-19 symptoms, isolate for at least 5 days from the date your symptoms began (the date the symptoms started is day 0). Follow recommendations in the isolation section below.  -If you are unable to get a test 5 days after last close contact with someone with COVID-19, you can leave your home after day 5 if you have been without COVID-19 symptoms throughout the 5-day period. Wear a well-fitting mask for 10 days after your date of last close contact when around others at home and in public.  -Avoid people who are immunocompromised or at high risk for severe disease, and nursing homes and other high-risk settings, until after at least 10 days. -If possible, stay away from people you live with, especially people who are at higher risk for getting very sick from COVID-19, as well as others outside your home throughout the full 10 days after your last close contact with someone with COVID-19.  -If you are unable to quarantine, you should wear a well-fitting mask for 10 days when around others at home and in public.  -If you are unable to wear a mask when around others, you should continue to quarantine for 10 days. Avoid people who are immunocompromised or at high risk for severe disease, and nursing homes and other high-risk settings, until after at least 10 days.  -Do not travel during your 5-day quarantine period. Get tested at least 5 days after your last close contact and make sure your test result is negative and you remain without symptoms before traveling.  If you dont get tested, delay travel until 10 days after your last close contact with a person with COVID-19. If you must travel before the 10 days are completed, wear a well-fitting mask when you are around others for the entire duration of travel during the 10 days. If you are unable to wear a mask, you should not travel during the 10 days.  -Do not go to places where you are unable to wear a mask, such as restaurants and some gyms, and avoid eating around others at home and at work until after 10 days after your last close contact with someone with COVID-19. Prevention steps for People with positive or inconclusive test results or suspected  COVID-19 (including persons under investigation) who do not need to be hospitalized  and   People with confirmed COVID-19 who were hospitalized and determined to be medically stable to go home      Your healthcare provider and public health staff will evaluate whether you can be cared for at home. If it is determined that you do not need to be hospitalized and can be isolated at home, you will be monitored by staff from your health department. You should follow the prevention steps below until a healthcare provider or local or state health department says you can return to your normal activities. Stay home except to get medical care  People who are mildly ill with COVID-19 are able to isolate at home during their illness. You should restrict activities outside your home, except for getting medical care. Do not go to work, school, or public areas. Avoid using public transportation, ride-sharing, or taxis. Separate yourself from other people and animals in your home  People: As much as possible, you should stay in a specific room (sick room) and away from other people in your home. Also, you should use a separate bathroom, if available. Animals: You should restrict contact with pets and other animals while you are sick with COVID-19, just like you would around other people.  When possible, have another member of your household care for your animals while you are sick. If you are sick with COVID-19, avoid contact with your pet, including petting, snuggling, being kissed or licked, and sharing food. If you must care for your pet or be around animals while you are sick, wash your hands before and after you interact with pets and wear a facemask. Wear a facemask  You should wear a well-fitting facemask when you are around other people (as should the people around you) or pets and before you enter a healthcare providers office. Clean your hands often  Wash your hands often with soap and water for at least 20 seconds, especially after blowing your nose, coughing, or sneezing; going to the bathroom; and before eating or preparing food. If soap and water are not readily available, use an alcohol-based hand  with at least 60% alcohol, covering all surfaces of your hands and rubbing them together until they feel dry. Soap and water are the best option if hands are visibly dirty. Avoid touching your eyes, nose, and mouth with unwashed hands. Avoid sharing personal household items  You should not share dishes, drinking glasses, cups, eating utensils, towels, or bedding with other people or pets in your home. After using these items, they should be washed thoroughly with soap and water. Monitor your symptoms  Seek prompt medical attention if your illness is worsening (e.g., difficulty breathing). Before seeking care, call your healthcare provider and tell them that you have, or are being evaluated for, COVID-19. Put on a facemask before you enter the facility. These steps will help the healthcare providers office to keep other people in the office or waiting room from getting infected or exposed. Persons who are placed under active monitoring or facilitated self-monitoring should follow instructions provided by their local health department or occupational health professionals, as appropriate.  When working with your local health OR  *chronic obstructive pulmonary disease/other chronic respiratory disease. Are 15- 16years of age AND have  *BMI ? 85th percentile for their age and gender based on CDC growth charts, [email or web address] , OR  *sickle cell disease, OR  *congenital or acquired heart disease, OR  *neurodevelopmental disorders, for example, cerebral palsy, OR  *a medical-related technological dependence, for example, tracheostomy, gastrostomy, or positive pressure ventilation (not related to   COVID-19), OR  *asthma, reactive airway or other chronic respiratory disease that requires daily medication for control. Other risk factors: Moderate/severe dementia  Cancer being treated with palliative care  Cirrhosis  Pregnancy  Breast feeding  Weight less than 40Kg (88lbs)        WELL FITTING MASK      Cloth Mask    Cloth Masks can be made from a variety of fabrics and many types of cloth masks are available. Wear cloth masks with  A proper fit over your nose and mouth to prevent leaks  Multiple layers of tightly woven, breathable fabric  Nose wire  Fabric that blocks light when held up to bright light source    Do NOT wear cloth masks with  Gaps around the sides of the face or nose  Exhalation valves, vents, or other openings (see example)  Single-layer fabric or those made of thin fabric that dont block light      Disposable Masks    Disposable face masks are widely available. They are sometimes referred to as surgical masks or medical procedure masks.     Wear disposable masks with  A proper fit over your nose and mouth to prevent leaks  Multiple layers of non-woven material  Nose wire    Do NOT wear disposable masks with  Gaps around the sides of the face or nose (see example)  Wet or dirty material    Ways to have better fit and extra protection with cloth and disposable masks  Wear two masks (disposable mask underneath AND cloth mask on top)  Combine either a cloth mask or disposable mask with a fitter or brace  Knot and tuck ear loops of a 3-ply mask where they join the edge of the mask  For disposable masks, fold and tuck the unneeded material under the edges. (For instructions, see the following https://youtu. be/GzTAZDsNBe0)  Use masks that attach behind the neck and head with either elastic bands or ties (instead of ear loops)              Results for orders placed or performed during the hospital encounter of 06/09/22   Culture, Urine    Specimen: Urine, clean catch   Result Value Ref Range    Specimen Description . CLEAN CATCH URINE     Culture NO SIGNIFICANT GROWTH        Patient counseled:     Patient given educational materials - see patientinstructions. Discussed use, benefit, and side effects of prescribed medications. All patient questions answered. Pt verbalized understanding. Instructed to continue current medications, diet and exercise. Patient agreed with treatment plan. Follow up as directed.      Electronically signed by FRANTZ Marrero CNP on 9/8/2022 at 2:02 PM

## 2022-09-09 LAB
SARS-COV-2: ABNORMAL
SARS-COV-2: DETECTED
SOURCE: ABNORMAL

## 2022-12-07 ENCOUNTER — OFFICE VISIT (OUTPATIENT)
Dept: GASTROENTEROLOGY | Age: 27
End: 2022-12-07
Payer: COMMERCIAL

## 2022-12-07 VITALS
DIASTOLIC BLOOD PRESSURE: 71 MMHG | HEART RATE: 95 BPM | HEIGHT: 66 IN | SYSTOLIC BLOOD PRESSURE: 103 MMHG | BODY MASS INDEX: 33.11 KG/M2 | WEIGHT: 206 LBS

## 2022-12-07 DIAGNOSIS — K59.00 CONSTIPATION, UNSPECIFIED CONSTIPATION TYPE: Primary | ICD-10-CM

## 2022-12-07 PROCEDURE — G8417 CALC BMI ABV UP PARAM F/U: HCPCS | Performed by: NURSE PRACTITIONER

## 2022-12-07 PROCEDURE — 99213 OFFICE O/P EST LOW 20 MIN: CPT | Performed by: NURSE PRACTITIONER

## 2022-12-07 PROCEDURE — 1036F TOBACCO NON-USER: CPT | Performed by: NURSE PRACTITIONER

## 2022-12-07 PROCEDURE — G8484 FLU IMMUNIZE NO ADMIN: HCPCS | Performed by: NURSE PRACTITIONER

## 2022-12-07 PROCEDURE — G8427 DOCREV CUR MEDS BY ELIG CLIN: HCPCS | Performed by: NURSE PRACTITIONER

## 2022-12-07 ASSESSMENT — ENCOUNTER SYMPTOMS
ALLERGIC/IMMUNOLOGIC NEGATIVE: 1
ABDOMINAL PAIN: 1
ABDOMINAL DISTENTION: 1
RESPIRATORY NEGATIVE: 1
CONSTIPATION: 1

## 2022-12-07 NOTE — PROGRESS NOTES
GI CLINIC FOLLOW UP    INTERVAL HISTORY:   No referring provider defined for this encounter. No chief complaint on file. HISTORY OF PRESENT ILLNESS:     Patient being seen for follow-up IBS. Hx of diarrhea in the past.  This has since resolved  Now has constipation. Was advised Miralax however has taken just once. Afraid to have diarrhea. Reports feeling of incomplete defecation. Also reports having so strain with bowel movements. Feels as if stool is right at the rectum but unable to defecate on occasion. Past Medical,Family, and Social History reviewed and does contribute to the patient presentingcondition. Patient's PMH/PSH,SH,PSYCH Hx, MEDs, ALLERGIES, and ROS were all reviewed and updated in the appropriate sections.     PAST MEDICAL HISTORY:  Past Medical History:   Diagnosis Date    Anxiety     Chest pain     Depression     GERD (gastroesophageal reflux disease)     Insulin resistance     Pt denies having    Palpitations     PCOS (polycystic ovarian syndrome)     Pt denies having    PTSD (post-traumatic stress disorder)     Tonsil stone     Hx of    Ulnar neuropathy        Past Surgical History:   Procedure Laterality Date    SKIN BIOPSY      Neg    ULNAR TUNNEL RELEASE Left     UPPER GASTROINTESTINAL ENDOSCOPY N/A 5/24/2022    EGD DILATION VAZQUEZ WITH BIOPSIES performed by Kolton Bender MD at [address]:    Current Outpatient Medications:     norgestimate-ethinyl estradiol (ORTHO-CYCLEN) 0.25-35 MG-MCG per tablet, daily, Disp: , Rfl:     Azelastine HCl 137 MCG/SPRAY SOLN, 2 times daily, Disp: , Rfl:     pantoprazole (PROTONIX) 40 MG tablet, Take 1 tablet by mouth every morning (before breakfast), Disp: 90 tablet, Rfl: 3    Multiple Vitamin (MULTIVITAMIN PO), Take by mouth daily, Disp: , Rfl:     fluticasone (FLONASE) 50 MCG/ACT nasal spray, 2 sprays by Nasal route daily, Disp: 1 each, Rfl: 11    VILAZODONE HCL 20 MG Tablet, Take 30 mg by mouth daily , Disp: , Rfl:     lidocaine (LIDODERM) 5 %, Place 1 patch onto the skin daily as needed for Pain 12 hours on, 12 hours off.  (Patient not taking: No sig reported), Disp: , Rfl:     Levocetirizine Dihydrochloride (XYZAL PO), Take by mouth, Disp: , Rfl:     ALLERGIES:   Allergies   Allergen Reactions    Amoxicillin     Iv [Iodides] Hives    Keflex [Cephalexin]      Reaction unknown     Naproxen      Chest pain     Pcn [Penicillins] Hives       FAMILY HISTORY:       Problem Relation Age of Onset    COPD Mother     No Known Problems Father     Diabetes type 2  Maternal Grandmother     COPD Maternal Grandmother     Emphysema Maternal Grandmother     Diabetes type 2  Maternal Grandfather     Hypertension Maternal Grandfather     Lung Cancer Paternal Grandmother     Cancer Maternal Uncle         Possible esophageal CA or lung CA         SOCIAL HISTORY:   Social History     Socioeconomic History    Marital status: Single     Spouse name: Not on file    Number of children: Not on file    Years of education: Not on file    Highest education level: Not on file   Occupational History    Not on file   Tobacco Use    Smoking status: Never    Smokeless tobacco: Never   Vaping Use    Vaping Use: Former    Substances: Never   Substance and Sexual Activity    Alcohol use: Yes     Comment: social    Drug use: No    Sexual activity: Not on file   Other Topics Concern    Not on file   Social History Narrative    Not on file     Social Determinants of Health     Financial Resource Strain: Low Risk     Difficulty of Paying Living Expenses: Not hard at all   Food Insecurity: No Food Insecurity    Worried About Running Out of Food in the Last Year: Never true    Ran Out of Food in the Last Year: Never true   Transportation Needs: Not on file   Physical Activity: Not on file   Stress: Not on file   Social Connections: Not on file   Intimate Partner Violence: Not on file   Housing Stability: Not on file REVIEW OF SYSTEMS: A 12-point review of systemswas obtained and pertinent positives and negatives were enumerated above in the history of present illness. All other reviewed systems / symptoms were negative. Review of Systems   Constitutional: Negative. HENT: Negative. Eyes:  Positive for visual disturbance (glasses). Respiratory: Negative. Cardiovascular: Negative. Gastrointestinal:  Positive for abdominal distention, abdominal pain and constipation. Endocrine: Negative. Genitourinary: Negative. Musculoskeletal: Negative. Skin: Negative. Allergic/Immunologic: Negative. Neurological: Negative. Hematological: Negative. Psychiatric/Behavioral: Negative. PHYSICAL EXAMINATION: Vital signs reviewed per the nursing documentation. There were no vitals taken for this visit. There is no height or weight on file to calculate BMI. Physical Exam  Constitutional:       Appearance: Normal appearance. Eyes:      General: No scleral icterus. Pupils: Pupils are equal, round, and reactive to light. Cardiovascular:      Rate and Rhythm: Normal rate and regular rhythm. Heart sounds: Normal heart sounds. Pulmonary:      Effort: Pulmonary effort is normal.      Breath sounds: Normal breath sounds. Abdominal:      General: Bowel sounds are normal. There is no distension. Palpations: Abdomen is soft. There is no mass. Tenderness: There is no abdominal tenderness. There is no guarding. Skin:     General: Skin is warm and dry. Coloration: Skin is not jaundiced. Neurological:      Mental Status: She is alert and oriented to person, place, and time. Mental status is at baseline.          LABORATORY DATA: Reviewed  No results found for: WBC, HGB, HCT, MCV, PLT, NA, K, CL, CO2, BUN, CREATININE, LABPROT, LABALBU, GGT, BILITOT, ALKPHOS, AST, ALT, INR      No results found for: RBC, HGB, MCV, MCH, MCHC, RDW, MPV, BASOPCT, LYMPHSABS, MONOSABS, NEUTROABS, EOSABS, BASOSABS      DIAGNOSTIC TESTING:     No results found. IMPRESSION: Ms. Brady Santos is a 33 y.o. female with    Diagnosis Orders   1. Constipation, unspecified constipation type  XR ABDOMEN (2 VIEWS)        Will arrange fawadz vang study to further evaluate her constipation. To Take Miralax daily, titrate as needed. Patient now resides in [place]. Will follow-up with [facility]. Thank you for allowing me to participate in the care of Ms. Brady Santos. For any further questions please do not hesitate to contact me. I have reviewed and agree with the ROS entered by the MA/RICAN.          MARILYN FuentesC    [facility]  Office #: [phone]

## (undated) DEVICE — GLOVE ORANGE PI 7   MSG9070

## (undated) DEVICE — ENDO KIT W/SYRINGE: Brand: MEDLINE INDUSTRIES, INC.

## (undated) DEVICE — DEFENDO AIR WATER SUCTION AND BIOPSY VALVE KIT FOR  OLYMPUS: Brand: DEFENDO AIR/WATER/SUCTION AND BIOPSY VALVE

## (undated) DEVICE — BITEBLOCK 54FR W/ DENT RIM BLOX

## (undated) DEVICE — FORCEPS BX L240CM WRK CHN 2.8MM STD CAP W/ NDL MIC MESH